# Patient Record
Sex: FEMALE | Race: WHITE | NOT HISPANIC OR LATINO | Employment: OTHER | URBAN - METROPOLITAN AREA
[De-identification: names, ages, dates, MRNs, and addresses within clinical notes are randomized per-mention and may not be internally consistent; named-entity substitution may affect disease eponyms.]

---

## 2020-11-29 ENCOUNTER — APPOINTMENT (OUTPATIENT)
Dept: GENERAL RADIOLOGY | Facility: HOSPITAL | Age: 65
End: 2020-11-29

## 2020-11-29 ENCOUNTER — HOSPITAL ENCOUNTER (EMERGENCY)
Facility: HOSPITAL | Age: 65
Discharge: HOME | End: 2020-11-29
Attending: EMERGENCY MEDICINE | Admitting: EMERGENCY MEDICINE

## 2020-11-29 ENCOUNTER — APPOINTMENT (OUTPATIENT)
Dept: CT IMAGING | Facility: HOSPITAL | Age: 65
End: 2020-11-29

## 2020-11-29 VITALS
HEIGHT: 61 IN | TEMPERATURE: 96.9 F | DIASTOLIC BLOOD PRESSURE: 84 MMHG | OXYGEN SATURATION: 94 % | WEIGHT: 210 LBS | HEART RATE: 78 BPM | SYSTOLIC BLOOD PRESSURE: 141 MMHG | RESPIRATION RATE: 16 BRPM | BODY MASS INDEX: 39.65 KG/M2

## 2020-11-29 DIAGNOSIS — S42.211A CLOSED DISPLACED FRACTURE OF SURGICAL NECK OF RIGHT HUMERUS, UNSPECIFIED FRACTURE MORPHOLOGY, INITIAL ENCOUNTER: ICD-10-CM

## 2020-11-29 DIAGNOSIS — S09.90XA INJURY OF HEAD, INITIAL ENCOUNTER: ICD-10-CM

## 2020-11-29 DIAGNOSIS — Z86.39 HISTORY OF HYPOTHYROIDISM: ICD-10-CM
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
## 2020-11-29 DIAGNOSIS — Z86.79 HISTORY OF HYPERTENSION: ICD-10-CM

## 2020-11-29 DIAGNOSIS — S16.1XXA ACUTE STRAIN OF NECK MUSCLE, INITIAL ENCOUNTER: ICD-10-CM

## 2020-11-29 PROCEDURE — 99283 EMERGENCY DEPT VISIT LOW MDM: CPT

## 2020-11-29 PROCEDURE — 63710000001 ONDANSETRON ODT 4 MG TABLET DISPERSIBLE: Performed by: EMERGENCY MEDICINE

## 2020-11-29 PROCEDURE — 70450 CT HEAD/BRAIN W/O DYE: CPT

## 2020-11-29 PROCEDURE — 72040 X-RAY EXAM NECK SPINE 2-3 VW: CPT

## 2020-11-29 PROCEDURE — 73030 X-RAY EXAM OF SHOULDER: CPT

## 2020-11-29 RX ORDER — OXYCODONE HYDROCHLORIDE AND ACETAMINOPHEN 5; 325 MG/1; MG/1
1 TABLET ORAL ONCE
Status: COMPLETED | OUTPATIENT
Start: 2020-11-29 | End: 2020-11-29

## 2020-11-29 RX ORDER — ONDANSETRON 4 MG/1
4 TABLET, ORALLY DISINTEGRATING ORAL ONCE
Status: COMPLETED | OUTPATIENT
Start: 2020-11-29 | End: 2020-11-29

## 2020-11-29 RX ORDER — ONDANSETRON 4 MG/1
4 TABLET, ORALLY DISINTEGRATING ORAL EVERY 4 HOURS
Qty: 12 TABLET | Refills: 0 | Status: SHIPPED | OUTPATIENT
Start: 2020-11-29 | End: 2020-12-04

## 2020-11-29 RX ORDER — OXYCODONE HYDROCHLORIDE AND ACETAMINOPHEN 5; 325 MG/1; MG/1
1 TABLET ORAL EVERY 4 HOURS PRN
Qty: 12 TABLET | Refills: 0 | Status: SHIPPED | OUTPATIENT
Start: 2020-11-29 | End: 2020-12-09 | Stop reason: HOSPADM

## 2020-11-29 RX ADMIN — ONDANSETRON 4 MG: 4 TABLET, ORALLY DISINTEGRATING ORAL at 20:05

## 2020-11-29 RX ADMIN — OXYCODONE HYDROCHLORIDE AND ACETAMINOPHEN 1 TABLET: 5; 325 TABLET ORAL at 20:04

## 2020-12-04 ENCOUNTER — APPOINTMENT (OUTPATIENT)
Dept: PREADMISSION TESTING | Facility: HOSPITAL | Age: 65
End: 2020-12-04

## 2020-12-04 ENCOUNTER — HOSPITAL ENCOUNTER (OUTPATIENT)
Dept: GENERAL RADIOLOGY | Facility: HOSPITAL | Age: 65
Discharge: HOME | End: 2020-12-04

## 2020-12-04 VITALS — BODY MASS INDEX: 39.12 KG/M2 | HEIGHT: 62 IN | WEIGHT: 212.6 LBS

## 2020-12-04 LAB
ALBUMIN SERPL-MCNC: 4.1 G/DL (ref 3.5–5.2)
ALBUMIN/GLOB SERPL: 1.3 G/DL
ALP SERPL-CCNC: 109 U/L (ref 39–117)
ALT SERPL W P-5'-P-CCNC: 20 U/L (ref 1–33)
ANION GAP SERPL CALCULATED.3IONS-SCNC: 10 MMOL/L (ref 5–15)
APTT PPP: 32.6 SECONDS (ref 24–37)
AST SERPL-CCNC: 21 U/L (ref 1–32)
BACTERIA UR QL AUTO: ABNORMAL /HPF
BASOPHILS # BLD AUTO: 0.07 10*3/MM3 (ref 0–0.2)
BASOPHILS NFR BLD AUTO: 0.6 % (ref 0–1.5)
BILIRUB SERPL-MCNC: 0.4 MG/DL (ref 0–1.2)
BILIRUB UR QL STRIP: NEGATIVE
BUN SERPL-MCNC: 14 MG/DL (ref 8–23)
BUN/CREAT SERPL: 20.3 (ref 7–25)
CALCIUM SPEC-SCNC: 9.8 MG/DL (ref 8.6–10.5)
CHLORIDE SERPL-SCNC: 104 MMOL/L (ref 98–107)
CLARITY UR: CLEAR
CO2 SERPL-SCNC: 25 MMOL/L (ref 22–29)
COLOR UR: YELLOW
CREAT SERPL-MCNC: 0.69 MG/DL (ref 0.57–1)
DEPRECATED RDW RBC AUTO: 42.2 FL (ref 37–54)
EOSINOPHIL # BLD AUTO: 0.16 10*3/MM3 (ref 0–0.4)
EOSINOPHIL NFR BLD AUTO: 1.5 % (ref 0.3–6.2)
ERYTHROCYTE [DISTWIDTH] IN BLOOD BY AUTOMATED COUNT: 12.9 % (ref 12.3–15.4)
GFR SERPL CREATININE-BSD FRML MDRD: 85 ML/MIN/1.73
GLOBULIN UR ELPH-MCNC: 3.1 GM/DL
GLUCOSE SERPL-MCNC: 84 MG/DL (ref 65–99)
GLUCOSE UR STRIP-MCNC: NEGATIVE MG/DL
HBA1C MFR BLD: 5.7 % (ref 4.8–5.6)
HCT VFR BLD AUTO: 40.8 % (ref 34–46.6)
HGB BLD-MCNC: 12.8 G/DL (ref 12–15.9)
HGB UR QL STRIP.AUTO: NEGATIVE
HYALINE CASTS UR QL AUTO: ABNORMAL /LPF
IMM GRANULOCYTES # BLD AUTO: 0.03 10*3/MM3 (ref 0–0.05)
IMM GRANULOCYTES NFR BLD AUTO: 0.3 % (ref 0–0.5)
INR PPP: 1.09 (ref 0.85–1.16)
KETONES UR QL STRIP: NEGATIVE
LEUKOCYTE ESTERASE UR QL STRIP.AUTO: ABNORMAL
LYMPHOCYTES # BLD AUTO: 2.29 10*3/MM3 (ref 0.7–3.1)
LYMPHOCYTES NFR BLD AUTO: 20.9 % (ref 19.6–45.3)
MCH RBC QN AUTO: 28.2 PG (ref 26.6–33)
MCHC RBC AUTO-ENTMCNC: 31.4 G/DL (ref 31.5–35.7)
MCV RBC AUTO: 89.9 FL (ref 79–97)
MONOCYTES # BLD AUTO: 0.98 10*3/MM3 (ref 0.1–0.9)
MONOCYTES NFR BLD AUTO: 8.9 % (ref 5–12)
NEUTROPHILS NFR BLD AUTO: 67.8 % (ref 42.7–76)
NEUTROPHILS NFR BLD AUTO: 7.45 10*3/MM3 (ref 1.7–7)
NITRITE UR QL STRIP: NEGATIVE
NRBC BLD AUTO-RTO: 0 /100 WBC (ref 0–0.2)
PH UR STRIP.AUTO: 7 [PH] (ref 5–8)
PLATELET # BLD AUTO: 303 10*3/MM3 (ref 140–450)
PMV BLD AUTO: 8.8 FL (ref 6–12)
POTASSIUM SERPL-SCNC: 3.9 MMOL/L (ref 3.5–5.2)
PROT SERPL-MCNC: 7.2 G/DL (ref 6–8.5)
PROT UR QL STRIP: NEGATIVE
PROTHROMBIN TIME: 13.8 SECONDS (ref 11.5–14)
QT INTERVAL: 380 MS
QTC INTERVAL: 446 MS
RBC # BLD AUTO: 4.54 10*6/MM3 (ref 3.77–5.28)
RBC # UR: ABNORMAL /HPF
REF LAB TEST METHOD: ABNORMAL
SODIUM SERPL-SCNC: 139 MMOL/L (ref 136–145)
SP GR UR STRIP: 1.01 (ref 1–1.03)
SQUAMOUS #/AREA URNS HPF: ABNORMAL /HPF
UROBILINOGEN UR QL STRIP: ABNORMAL
WBC # BLD AUTO: 10.98 10*3/MM3 (ref 3.4–10.8)
WBC UR QL AUTO: ABNORMAL /HPF

## 2020-12-04 PROCEDURE — 71046 X-RAY EXAM CHEST 2 VIEWS: CPT

## 2020-12-04 PROCEDURE — 93005 ELECTROCARDIOGRAM TRACING: CPT

## 2020-12-04 PROCEDURE — 85025 COMPLETE CBC W/AUTO DIFF WBC: CPT

## 2020-12-04 PROCEDURE — 93010 ELECTROCARDIOGRAM REPORT: CPT | Performed by: INTERNAL MEDICINE

## 2020-12-04 PROCEDURE — 87086 URINE CULTURE/COLONY COUNT: CPT

## 2020-12-04 PROCEDURE — 85610 PROTHROMBIN TIME: CPT

## 2020-12-04 PROCEDURE — 83036 HEMOGLOBIN GLYCOSYLATED A1C: CPT

## 2020-12-04 PROCEDURE — 81001 URINALYSIS AUTO W/SCOPE: CPT

## 2020-12-04 PROCEDURE — 36415 COLL VENOUS BLD VENIPUNCTURE: CPT

## 2020-12-04 PROCEDURE — 80053 COMPREHEN METABOLIC PANEL: CPT

## 2020-12-04 PROCEDURE — 85730 THROMBOPLASTIN TIME PARTIAL: CPT

## 2020-12-04 RX ORDER — ACETAMINOPHEN 500 MG
1000 TABLET ORAL EVERY 6 HOURS PRN
COMMUNITY
End: 2021-04-30

## 2020-12-04 RX ORDER — LEVOTHYROXINE SODIUM 75 MCG
75 TABLET ORAL DAILY
COMMUNITY
Start: 2020-09-29

## 2020-12-04 RX ORDER — VENLAFAXINE HYDROCHLORIDE 150 MG/1
150 CAPSULE, EXTENDED RELEASE ORAL DAILY
COMMUNITY
Start: 2020-09-21

## 2020-12-04 RX ORDER — AMLODIPINE BESYLATE 2.5 MG/1
2.5 TABLET ORAL EVERY MORNING
COMMUNITY
Start: 2020-09-21

## 2020-12-04 NOTE — PAT
Patient to apply Chlorhexadine wipes  to surgical area (as instructed) the night before procedure and the AM of procedure. Wipes provided.    Patient instructed to drink 20 ounces (or until full) of Gatorade and it needs to be completed 1 hour before given arrival time for procedure (NO RED Gatorade)    Patient verbalized understanding.    An arrival time for procedure was not given during PAT visit. If patient had any questions or concerns about their arrival time, they were instructed to contact their surgeon/physician.  Additionally, if the patient referred to an arrival time that was acquired from their my chart account, patient was encouraged to verify that time with their surgeon/physician.  NO arrival times given in Pre Admission Testing Department.

## 2020-12-05 LAB — BACTERIA SPEC AEROBE CULT: ABNORMAL

## 2020-12-06 ENCOUNTER — APPOINTMENT (OUTPATIENT)
Dept: PREADMISSION TESTING | Facility: HOSPITAL | Age: 65
End: 2020-12-06

## 2020-12-06 LAB — SARS-COV-2 RNA RESP QL NAA+PROBE: NOT DETECTED

## 2020-12-06 PROCEDURE — C9803 HOPD COVID-19 SPEC COLLECT: HCPCS

## 2020-12-06 PROCEDURE — U0004 COV-19 TEST NON-CDC HGH THRU: HCPCS

## 2020-12-08 ENCOUNTER — APPOINTMENT (OUTPATIENT)
Dept: GENERAL RADIOLOGY | Facility: HOSPITAL | Age: 65
End: 2020-12-08

## 2020-12-08 ENCOUNTER — ANESTHESIA EVENT (OUTPATIENT)
Dept: PERIOP | Facility: HOSPITAL | Age: 65
End: 2020-12-08

## 2020-12-08 ENCOUNTER — ANESTHESIA (OUTPATIENT)
Dept: PERIOP | Facility: HOSPITAL | Age: 65
End: 2020-12-08

## 2020-12-08 ENCOUNTER — HOSPITAL ENCOUNTER (OUTPATIENT)
Facility: HOSPITAL | Age: 65
LOS: 1 days | Discharge: HOME | End: 2020-12-09
Attending: ORTHOPAEDIC SURGERY | Admitting: ORTHOPAEDIC SURGERY

## 2020-12-08 DIAGNOSIS — Z87.81 S/P ORIF (OPEN REDUCTION INTERNAL FIXATION) FRACTURE: Primary | ICD-10-CM
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
## 2020-12-08 DIAGNOSIS — S42.211A CLOSED DISPLACED FRACTURE OF SURGICAL NECK OF RIGHT HUMERUS, UNSPECIFIED FRACTURE MORPHOLOGY, INITIAL ENCOUNTER: ICD-10-CM

## 2020-12-08 DIAGNOSIS — Z98.890 S/P ORIF (OPEN REDUCTION INTERNAL FIXATION) FRACTURE: Primary | ICD-10-CM

## 2020-12-08 PROBLEM — I10 ESSENTIAL HYPERTENSION: Status: ACTIVE | Noted: 2020-12-08

## 2020-12-08 PROBLEM — S42.209A PROXIMAL HUMERAL FRACTURE: Status: ACTIVE | Noted: 2020-12-08

## 2020-12-08 PROBLEM — E66.9 OBESITY (BMI 30-39.9): Status: ACTIVE | Noted: 2020-12-08

## 2020-12-08 PROBLEM — Z99.89 OSA ON CPAP: Status: ACTIVE | Noted: 2020-12-08

## 2020-12-08 PROBLEM — G47.33 OSA ON CPAP: Status: ACTIVE | Noted: 2020-12-08

## 2020-12-08 PROCEDURE — 25010000002 ONDANSETRON PER 1 MG: Performed by: NURSE ANESTHETIST, CERTIFIED REGISTERED

## 2020-12-08 PROCEDURE — C1713 ANCHOR/SCREW BN/BN,TIS/BN: HCPCS | Performed by: ORTHOPAEDIC SURGERY

## 2020-12-08 PROCEDURE — 25010000002 PHENYLEPHRINE PER 1 ML: Performed by: NURSE ANESTHETIST, CERTIFIED REGISTERED

## 2020-12-08 PROCEDURE — 25010000002 CEFAZOLIN PER 500 MG: Performed by: ORTHOPAEDIC SURGERY

## 2020-12-08 PROCEDURE — 25010000002 VANCOMYCIN 1 G RECONSTITUTED SOLUTION: Performed by: ORTHOPAEDIC SURGERY

## 2020-12-08 PROCEDURE — 25010000003 CEFAZOLIN IN DEXTROSE 2-4 GM/100ML-% SOLUTION: Performed by: ORTHOPAEDIC SURGERY

## 2020-12-08 PROCEDURE — 25010000002 DEXAMETHASONE PER 1 MG: Performed by: NURSE ANESTHETIST, CERTIFIED REGISTERED

## 2020-12-08 PROCEDURE — 25010000002 NEOSTIGMINE 10 MG/10ML SOLUTION: Performed by: NURSE ANESTHETIST, CERTIFIED REGISTERED

## 2020-12-08 PROCEDURE — 76000 FLUOROSCOPY <1 HR PHYS/QHP: CPT

## 2020-12-08 PROCEDURE — 25010000002 ROPIVACAINE PER 1 MG: Performed by: NURSE ANESTHETIST, CERTIFIED REGISTERED

## 2020-12-08 PROCEDURE — 25010000002 PROPOFOL 10 MG/ML EMULSION: Performed by: NURSE ANESTHETIST, CERTIFIED REGISTERED

## 2020-12-08 PROCEDURE — 25010000002 FENTANYL CITRATE (PF) 100 MCG/2ML SOLUTION: Performed by: NURSE ANESTHETIST, CERTIFIED REGISTERED

## 2020-12-08 DEVICE — CANNULATED SCREW, Ø3.5MM X L39MM
Type: IMPLANTABLE DEVICE | Site: ARM | Status: FUNCTIONAL
Brand: CANNULATED SCREW, 3.5MM

## 2020-12-08 DEVICE — CANNULATED SCREW, Ø3.5MM X L36MM
Type: IMPLANTABLE DEVICE | Site: ARM | Status: FUNCTIONAL
Brand: CANNULATED SCREW, 3.5MM

## 2020-12-08 DEVICE — CORTICAL SCREW, Ø3.5MM X L30MM
Type: IMPLANTABLE DEVICE | Site: ARM | Status: FUNCTIONAL
Brand: CORTICAL SCREW, 3.5MM

## 2020-12-08 DEVICE — CANNULATED SCREW, Ø3.5MM X L42MM
Type: IMPLANTABLE DEVICE | Site: ARM | Status: FUNCTIONAL
Brand: CANNULATED SCREW, 3.5MM

## 2020-12-08 DEVICE — CORTICAL SCREW, Ø3.5MM X L24MM
Type: IMPLANTABLE DEVICE | Site: ARM | Status: FUNCTIONAL
Brand: CORTICAL SCREW, 3.5MM

## 2020-12-08 DEVICE — IMPLANTABLE DEVICE
Type: IMPLANTABLE DEVICE | Site: ARM | Status: FUNCTIONAL
Brand: CERAMENT™BONE VOID FILLER

## 2020-12-08 DEVICE — SUT FW #2 W/TPR NDL 1/2 CIR 38IN 97CM 26.5MM BLU: Type: IMPLANTABLE DEVICE | Site: ARM | Status: FUNCTIONAL

## 2020-12-08 DEVICE — CANNULATED SCREW, Ø3.5MM X L45MM
Type: IMPLANTABLE DEVICE | Site: ARM | Status: FUNCTIONAL
Brand: CANNULATED SCREW, 3.5MM

## 2020-12-08 DEVICE — PROXIMAL HUMERUS PLATE, 3 HOLE RIGHT
Type: IMPLANTABLE DEVICE | Site: ARM | Status: FUNCTIONAL
Brand: PROXIMAL HUMERUS PLATE

## 2020-12-08 DEVICE — LOCKING CAP, 3.5T
Type: IMPLANTABLE DEVICE | Site: ARM | Status: FUNCTIONAL
Brand: LOCKING CAP

## 2020-12-08 RX ORDER — PROMETHAZINE HYDROCHLORIDE 25 MG/1
25 TABLET ORAL ONCE AS NEEDED
Status: DISCONTINUED | OUTPATIENT
Start: 2020-12-08 | End: 2020-12-08 | Stop reason: HOSPADM

## 2020-12-08 RX ORDER — SODIUM CHLORIDE 0.9 % (FLUSH) 0.9 %
10 SYRINGE (ML) INJECTION EVERY 12 HOURS SCHEDULED
Status: CANCELLED | OUTPATIENT
Start: 2020-12-08

## 2020-12-08 RX ORDER — PREGABALIN 75 MG/1
75 CAPSULE ORAL ONCE
Status: COMPLETED | OUTPATIENT
Start: 2020-12-08 | End: 2020-12-08

## 2020-12-08 RX ORDER — SODIUM CHLORIDE 0.9 % (FLUSH) 0.9 %
10 SYRINGE (ML) INJECTION AS NEEDED
Status: CANCELLED | OUTPATIENT
Start: 2020-12-08

## 2020-12-08 RX ORDER — VENLAFAXINE HYDROCHLORIDE 75 MG/1
150 CAPSULE, EXTENDED RELEASE ORAL DAILY
Status: DISCONTINUED | OUTPATIENT
Start: 2020-12-09 | End: 2020-12-09 | Stop reason: HOSPADM

## 2020-12-08 RX ORDER — ONDANSETRON 2 MG/ML
4 INJECTION INTRAMUSCULAR; INTRAVENOUS ONCE AS NEEDED
Status: DISCONTINUED | OUTPATIENT
Start: 2020-12-08 | End: 2020-12-08 | Stop reason: HOSPADM

## 2020-12-08 RX ORDER — ACETAMINOPHEN 650 MG/1
650 SUPPOSITORY RECTAL EVERY 4 HOURS PRN
Status: DISCONTINUED | OUTPATIENT
Start: 2020-12-08 | End: 2020-12-09 | Stop reason: HOSPADM

## 2020-12-08 RX ORDER — GLYCOPYRROLATE 0.2 MG/ML
INJECTION INTRAMUSCULAR; INTRAVENOUS AS NEEDED
Status: DISCONTINUED | OUTPATIENT
Start: 2020-12-08 | End: 2020-12-08 | Stop reason: SURG

## 2020-12-08 RX ORDER — ONDANSETRON 2 MG/ML
4 INJECTION INTRAMUSCULAR; INTRAVENOUS EVERY 6 HOURS PRN
Status: DISCONTINUED | OUTPATIENT
Start: 2020-12-08 | End: 2020-12-09 | Stop reason: HOSPADM

## 2020-12-08 RX ORDER — AMLODIPINE BESYLATE 2.5 MG/1
2.5 TABLET ORAL DAILY
Status: DISCONTINUED | OUTPATIENT
Start: 2020-12-09 | End: 2020-12-09 | Stop reason: HOSPADM

## 2020-12-08 RX ORDER — ONDANSETRON 4 MG/1
4 TABLET, FILM COATED ORAL EVERY 6 HOURS PRN
Status: DISCONTINUED | OUTPATIENT
Start: 2020-12-08 | End: 2020-12-09 | Stop reason: HOSPADM

## 2020-12-08 RX ORDER — BUPIVACAINE HYDROCHLORIDE 2.5 MG/ML
INJECTION, SOLUTION EPIDURAL; INFILTRATION; INTRACAUDAL
Status: COMPLETED | OUTPATIENT
Start: 2020-12-08 | End: 2020-12-08

## 2020-12-08 RX ORDER — SODIUM CHLORIDE, SODIUM LACTATE, POTASSIUM CHLORIDE, CALCIUM CHLORIDE 600; 310; 30; 20 MG/100ML; MG/100ML; MG/100ML; MG/100ML
9 INJECTION, SOLUTION INTRAVENOUS CONTINUOUS
Status: DISCONTINUED | OUTPATIENT
Start: 2020-12-08 | End: 2020-12-08

## 2020-12-08 RX ORDER — MAGNESIUM HYDROXIDE 1200 MG/15ML
LIQUID ORAL AS NEEDED
Status: DISCONTINUED | OUTPATIENT
Start: 2020-12-08 | End: 2020-12-08 | Stop reason: HOSPADM

## 2020-12-08 RX ORDER — LIDOCAINE HYDROCHLORIDE 10 MG/ML
INJECTION, SOLUTION EPIDURAL; INFILTRATION; INTRACAUDAL; PERINEURAL AS NEEDED
Status: DISCONTINUED | OUTPATIENT
Start: 2020-12-08 | End: 2020-12-08 | Stop reason: SURG

## 2020-12-08 RX ORDER — LABETALOL HYDROCHLORIDE 5 MG/ML
5 INJECTION, SOLUTION INTRAVENOUS
Status: DISCONTINUED | OUTPATIENT
Start: 2020-12-08 | End: 2020-12-08 | Stop reason: HOSPADM

## 2020-12-08 RX ORDER — ACETAMINOPHEN 325 MG/1
650 TABLET ORAL EVERY 4 HOURS PRN
Status: DISCONTINUED | OUTPATIENT
Start: 2020-12-08 | End: 2020-12-09 | Stop reason: HOSPADM

## 2020-12-08 RX ORDER — OXYCODONE HYDROCHLORIDE 5 MG/1
10 TABLET ORAL EVERY 4 HOURS PRN
Status: DISCONTINUED | OUTPATIENT
Start: 2020-12-08 | End: 2020-12-09 | Stop reason: HOSPADM

## 2020-12-08 RX ORDER — VANCOMYCIN HYDROCHLORIDE 1 G/20ML
INJECTION, POWDER, LYOPHILIZED, FOR SOLUTION INTRAVENOUS AS NEEDED
Status: DISCONTINUED | OUTPATIENT
Start: 2020-12-08 | End: 2020-12-08 | Stop reason: HOSPADM

## 2020-12-08 RX ORDER — FENTANYL CITRATE 50 UG/ML
INJECTION, SOLUTION INTRAMUSCULAR; INTRAVENOUS
Status: COMPLETED | OUTPATIENT
Start: 2020-12-08 | End: 2020-12-08

## 2020-12-08 RX ORDER — ACETAMINOPHEN 500 MG
1000 TABLET ORAL ONCE
Status: COMPLETED | OUTPATIENT
Start: 2020-12-08 | End: 2020-12-08

## 2020-12-08 RX ORDER — NEOSTIGMINE METHYLSULFATE 1 MG/ML
INJECTION, SOLUTION INTRAVENOUS AS NEEDED
Status: DISCONTINUED | OUTPATIENT
Start: 2020-12-08 | End: 2020-12-08 | Stop reason: SURG

## 2020-12-08 RX ORDER — NALOXONE HCL 0.4 MG/ML
0.1 VIAL (ML) INJECTION
Status: DISCONTINUED | OUTPATIENT
Start: 2020-12-08 | End: 2020-12-09 | Stop reason: HOSPADM

## 2020-12-08 RX ORDER — FAMOTIDINE 10 MG/ML
20 INJECTION, SOLUTION INTRAVENOUS ONCE
Status: CANCELLED | OUTPATIENT
Start: 2020-12-08 | End: 2020-12-08

## 2020-12-08 RX ORDER — FAMOTIDINE 20 MG/1
20 TABLET, FILM COATED ORAL ONCE
Status: COMPLETED | OUTPATIENT
Start: 2020-12-08 | End: 2020-12-08

## 2020-12-08 RX ORDER — DEXAMETHASONE SODIUM PHOSPHATE 10 MG/ML
INJECTION INTRAMUSCULAR; INTRAVENOUS AS NEEDED
Status: DISCONTINUED | OUTPATIENT
Start: 2020-12-08 | End: 2020-12-08 | Stop reason: SURG

## 2020-12-08 RX ORDER — LEVOTHYROXINE SODIUM 0.07 MG/1
75 TABLET ORAL DAILY
Status: DISCONTINUED | OUTPATIENT
Start: 2020-12-09 | End: 2020-12-09 | Stop reason: HOSPADM

## 2020-12-08 RX ORDER — LIDOCAINE HYDROCHLORIDE 10 MG/ML
0.5 INJECTION, SOLUTION EPIDURAL; INFILTRATION; INTRACAUDAL; PERINEURAL ONCE AS NEEDED
Status: COMPLETED | OUTPATIENT
Start: 2020-12-08 | End: 2020-12-08

## 2020-12-08 RX ORDER — OXYCODONE HYDROCHLORIDE 5 MG/1
5 TABLET ORAL EVERY 4 HOURS PRN
Status: DISCONTINUED | OUTPATIENT
Start: 2020-12-08 | End: 2020-12-09 | Stop reason: HOSPADM

## 2020-12-08 RX ORDER — HYDROMORPHONE HCL 110MG/55ML
0.5 PATIENT CONTROLLED ANALGESIA SYRINGE INTRAVENOUS
Status: DISCONTINUED | OUTPATIENT
Start: 2020-12-08 | End: 2020-12-09 | Stop reason: HOSPADM

## 2020-12-08 RX ORDER — LABETALOL HYDROCHLORIDE 5 MG/ML
10 INJECTION, SOLUTION INTRAVENOUS EVERY 4 HOURS PRN
Status: DISCONTINUED | OUTPATIENT
Start: 2020-12-08 | End: 2020-12-09 | Stop reason: HOSPADM

## 2020-12-08 RX ORDER — ONDANSETRON 2 MG/ML
INJECTION INTRAMUSCULAR; INTRAVENOUS AS NEEDED
Status: DISCONTINUED | OUTPATIENT
Start: 2020-12-08 | End: 2020-12-08 | Stop reason: SURG

## 2020-12-08 RX ORDER — IPRATROPIUM BROMIDE AND ALBUTEROL SULFATE 2.5; .5 MG/3ML; MG/3ML
3 SOLUTION RESPIRATORY (INHALATION) ONCE AS NEEDED
Status: DISCONTINUED | OUTPATIENT
Start: 2020-12-08 | End: 2020-12-08 | Stop reason: HOSPADM

## 2020-12-08 RX ORDER — CEFAZOLIN SODIUM 2 G/100ML
2 INJECTION, SOLUTION INTRAVENOUS ONCE
Status: COMPLETED | OUTPATIENT
Start: 2020-12-08 | End: 2020-12-08

## 2020-12-08 RX ORDER — PROPOFOL 10 MG/ML
VIAL (ML) INTRAVENOUS AS NEEDED
Status: DISCONTINUED | OUTPATIENT
Start: 2020-12-08 | End: 2020-12-08 | Stop reason: SURG

## 2020-12-08 RX ORDER — CEFAZOLIN SODIUM 2 G/100ML
2 INJECTION, SOLUTION INTRAVENOUS EVERY 8 HOURS
Status: COMPLETED | OUTPATIENT
Start: 2020-12-08 | End: 2020-12-09

## 2020-12-08 RX ORDER — ROCURONIUM BROMIDE 10 MG/ML
INJECTION, SOLUTION INTRAVENOUS AS NEEDED
Status: DISCONTINUED | OUTPATIENT
Start: 2020-12-08 | End: 2020-12-08 | Stop reason: SURG

## 2020-12-08 RX ORDER — FENTANYL CITRATE 50 UG/ML
25 INJECTION, SOLUTION INTRAMUSCULAR; INTRAVENOUS
Status: DISCONTINUED | OUTPATIENT
Start: 2020-12-08 | End: 2020-12-08 | Stop reason: HOSPADM

## 2020-12-08 RX ORDER — SODIUM CHLORIDE 450 MG/100ML
50 INJECTION, SOLUTION INTRAVENOUS CONTINUOUS
Status: DISCONTINUED | OUTPATIENT
Start: 2020-12-08 | End: 2020-12-09 | Stop reason: HOSPADM

## 2020-12-08 RX ADMIN — EPHEDRINE SULFATE 5 MG: 50 INJECTION INTRAMUSCULAR; INTRAVENOUS; SUBCUTANEOUS at 14:35

## 2020-12-08 RX ADMIN — SODIUM CHLORIDE, POTASSIUM CHLORIDE, SODIUM LACTATE AND CALCIUM CHLORIDE: 600; 310; 30; 20 INJECTION, SOLUTION INTRAVENOUS at 15:51

## 2020-12-08 RX ADMIN — PROPOFOL 200 MG: 10 INJECTION, EMULSION INTRAVENOUS at 14:08

## 2020-12-08 RX ADMIN — FENTANYL CITRATE 100 MCG: 50 INJECTION, SOLUTION INTRAMUSCULAR; INTRAVENOUS at 12:43

## 2020-12-08 RX ADMIN — LIDOCAINE HYDROCHLORIDE 50 MG: 10 INJECTION, SOLUTION EPIDURAL; INFILTRATION; INTRACAUDAL; PERINEURAL at 14:08

## 2020-12-08 RX ADMIN — GLYCOPYRROLATE 0.1 MG: 0.2 INJECTION INTRAMUSCULAR; INTRAVENOUS at 16:11

## 2020-12-08 RX ADMIN — ACETAMINOPHEN 1000 MG: 500 TABLET ORAL at 11:57

## 2020-12-08 RX ADMIN — TRANEXAMIC ACID 1000 MG: 100 INJECTION, SOLUTION INTRAVENOUS at 14:11

## 2020-12-08 RX ADMIN — BUPIVACAINE HYDROCHLORIDE 30 ML: 2.5 INJECTION, SOLUTION EPIDURAL; INFILTRATION; INTRACAUDAL; PERINEURAL at 12:43

## 2020-12-08 RX ADMIN — ONDANSETRON 4 MG: 2 INJECTION INTRAMUSCULAR; INTRAVENOUS at 16:04

## 2020-12-08 RX ADMIN — EPHEDRINE SULFATE 5 MG: 50 INJECTION INTRAMUSCULAR; INTRAVENOUS; SUBCUTANEOUS at 14:23

## 2020-12-08 RX ADMIN — PHENYLEPHRINE HYDROCHLORIDE 0.5 MCG/KG/MIN: 10 INJECTION INTRAVENOUS at 14:45

## 2020-12-08 RX ADMIN — SODIUM CHLORIDE, POTASSIUM CHLORIDE, SODIUM LACTATE AND CALCIUM CHLORIDE 9 ML/HR: 600; 310; 30; 20 INJECTION, SOLUTION INTRAVENOUS at 11:57

## 2020-12-08 RX ADMIN — EPHEDRINE SULFATE 10 MG: 50 INJECTION INTRAMUSCULAR; INTRAVENOUS; SUBCUTANEOUS at 14:20

## 2020-12-08 RX ADMIN — SODIUM CHLORIDE 50 ML/HR: 4.5 INJECTION, SOLUTION INTRAVENOUS at 18:24

## 2020-12-08 RX ADMIN — TRANEXAMIC ACID 1000 MG: 100 INJECTION, SOLUTION INTRAVENOUS at 16:07

## 2020-12-08 RX ADMIN — FAMOTIDINE 20 MG: 20 TABLET, FILM COATED ORAL at 11:57

## 2020-12-08 RX ADMIN — NEOSTIGMINE 2 MG: 1 INJECTION INTRAVENOUS at 16:11

## 2020-12-08 RX ADMIN — ROCURONIUM BROMIDE 40 MG: 10 INJECTION INTRAVENOUS at 14:08

## 2020-12-08 RX ADMIN — PREGABALIN 75 MG: 75 CAPSULE ORAL at 11:57

## 2020-12-08 RX ADMIN — CEFAZOLIN SODIUM 2 G: 10 INJECTION, POWDER, FOR SOLUTION INTRAVENOUS at 22:28

## 2020-12-08 RX ADMIN — ROPIVACAINE HYDROCHLORIDE 6 ML/HR: 5 INJECTION, SOLUTION EPIDURAL; INFILTRATION; PERINEURAL at 16:58

## 2020-12-08 RX ADMIN — ROCURONIUM BROMIDE 10 MG: 10 INJECTION INTRAVENOUS at 14:45

## 2020-12-08 RX ADMIN — CEFAZOLIN SODIUM 2 G: 2 INJECTION, SOLUTION INTRAVENOUS at 14:04

## 2020-12-08 RX ADMIN — LIDOCAINE HYDROCHLORIDE 0.5 ML: 10 INJECTION, SOLUTION EPIDURAL; INFILTRATION; INTRACAUDAL; PERINEURAL at 11:57

## 2020-12-08 RX ADMIN — DEXAMETHASONE SODIUM PHOSPHATE 8 MG: 10 INJECTION INTRAMUSCULAR; INTRAVENOUS at 14:13

## 2020-12-08 NOTE — ANESTHESIA PROCEDURE NOTES
INTERSCALENE cATHETER      Patient reassessed immediately prior to procedure    Patient location during procedure: pre-op  Reason for block: at surgeon's request and post-op pain management  Performed by  CRNA: Brittney Chand CRNA  Assisted by: Yuko Mckeon RN  Preanesthetic Checklist  Completed: patient identified, site marked, surgical consent, pre-op evaluation, timeout performed, IV checked, risks and benefits discussed and monitors and equipment checked  Prep:  Pt Position: left lateral decubitus  Sterile barriers:cap, gloves, mask and sterile barriers  Prep: ChloraPrep  Patient monitoring: blood pressure monitoring, continuous pulse oximetry and EKG  Procedure  Sedation:yes  Performed under: local infiltration  Guidance:ultrasound guided  Images:still images obtained, printed/placed on chart    Laterality:right  Block Type:interscalene  Injection Technique:catheter  Needle Type:Tuohy and echogenic  Needle Gauge:18 G  Resistance on Injection: none  Catheter Size:20 G (20g)  Cath Depth at skin: 7 cm    Medications Used: fentaNYL citrate (PF) (SUBLIMAZE) injection, 100 mcg  bupivacaine PF (MARCAINE) 0.25 % injection, 30 mL  Med admintered at 12/8/2020 12:43 PM      Post Assessment  Injection Assessment: negative aspiration for heme, no paresthesia on injection and incremental injection  Patient Tolerance:comfortable throughout block  Complications:no  Additional Notes  Procedure:                 The pt was placed in semifowlers position with a slight tilt of the thorax contralateral to the insertion site.  The Insertion Site was prepped and draped in sterile fashion.  The pt was anesthetized with  IV Sedation( see meds) and  Skin and cutaneous tissue was infiltrated and anesthetized with 1% Lidocaine 3 mls via a 25g needle.  Utilizing ultrasound guidance, a BBraun 2 inch 18 g Contiplex echogenic touhy needle was advanced in-plane.  Hydro dissection of tissue was achieved with Normal saline. Major  vessels(carotid and Internal Jugular) where visualized as the brachial plexus was approached at the approximate level of C-7/ T-1.  Cervical 5 and Branches of Cervical 6 nerve roots where visualized and the needle tip was placed posterior at the level of C-6 roots.  LA spread was visualized and injection was made incrementally every 5 mls with aspiration. Injection pressure was normal or little, there was no intraneural injection, no vascular injection.      The BBraun 20 g wire stylet  catheter was then placed under US guidance on the posterior aspect of the Brachial Plexus.  Location of catheter was confirmed with NS injection visualized with US . The tuohy was then removed and the skin was sealed with Skin AFix at catheter insertion site.  Skin was prepped with mastisol and the labeled catheter  was secured with steristrips and a CHG tegaderm. Thank You.

## 2020-12-08 NOTE — ANESTHESIA PROCEDURE NOTES
Airway  Urgency: elective    Date/Time: 12/8/2020 2:10 PM  Airway not difficult    General Information and Staff    Patient location during procedure: OR  CRNA: Lian Clinton CRNA    Indications and Patient Condition  Indications for airway management: airway protection    Preoxygenated: yes  MILS not maintained throughout  Mask difficulty assessment: 1 - vent by mask    Final Airway Details  Final airway type: endotracheal airway      Successful airway: ETT  Cuffed: yes   Successful intubation technique: video laryngoscopy  Endotracheal tube insertion site: oral  Blade: Mcrae  Blade size: 3  ETT size (mm): 7.0  Cormack-Lehane Classification: grade I - full view of glottis  Placement verified by: chest auscultation and capnometry   Cuff volume (mL): 6  Measured from: lips  ETT/EBT  to lips (cm): 20  Number of attempts at approach: 1  Assessment: lips, teeth, and gum same as pre-op and atraumatic intubation    Additional Comments  Negative epigastric sounds, Breath sound equal bilaterally with symmetric chest rise and fall

## 2020-12-08 NOTE — H&P
Pre-Op H&P  Génesis Bañuelos  [id number]  1955      Chief complaint: Right humerus fracture      Subjective:  Patient is a 65 y.o.female presents for scheduled surgery by Dr. Acosta.  She anticipates a Right humerus proximal open reduction internal fixation today.  She injured her arm in a fall on 11/29/2020.  She was seen in the emergency room at that time.  Of note on preadmission testing 12/4 she was found to have an E. coli UTI and is still taking oral antibiotics.  She did not denies urinary symptoms.      Review of Systems:  Constitutional-- No fever, chills or sweats. No fatigue.  CV-- No chest pain, palpitation or syncope. +HTN  Resp-- No SOB, cough, hemoptysis  Skin--No rashes or lesions      Allergies:   Allergies   Allergen Reactions   • Penicillins Rash         Home Meds:  Medications Prior to Admission   Medication Sig Dispense Refill Last Dose   • acetaminophen (TYLENOL) 500 MG tablet Take 1,000 mg by mouth Every 6 (Six) Hours As Needed for Mild Pain .   Past Month at Unknown time   • amLODIPine (NORVASC) 2.5 MG tablet Take 2.5 mg by mouth Every Morning.   12/8/2020 at Unknown time   • oxyCODONE-acetaminophen (PERCOCET) 5-325 MG per tablet Take 1 tablet by mouth Every 4 (Four) Hours As Needed for Moderate Pain . 12 tablet 0 12/7/2020 at Unknown time   • Synthroid 75 MCG tablet Take 75 mcg by mouth Daily.   12/8/2020 at Unknown time   • venlafaxine XR (EFFEXOR-XR) 150 MG 24 hr capsule Take 150 mg by mouth Daily.   12/7/2020 at Unknown time         PMH:   Past Medical History:   Diagnosis Date   • Depression    • Hypertension    • Hypothyroidism    • Sleep apnea     CPAP AT HOME      PSH:    Past Surgical History:   Procedure Laterality Date   • CERVICAL CONE BIOPSY     • COLONOSCOPY     • LUMBAR LAMINECTOMY DISCECTOMY DECOMPRESSION     • TONSILLECTOMY         Immunization History:  Influenza: 2020  Pneumococcal: Yes  Tetanus: No      Social History:   Tobacco:   Social History     Tobacco Use  "  Smoking Status Never Smoker   Smokeless Tobacco Never Used      Alcohol:     Social History     Substance and Sexual Activity   Alcohol Use Not Currently    Comment: occasionally         Physical Exam:/69 (BP Location: Right arm, Patient Position: Lying)   Pulse 87   Temp 98 °F (36.7 °C) (Tympanic)   Resp 18   Ht 157.5 cm (62\")   Wt 96.2 kg (212 lb)   SpO2 93%   BMI 38.78 kg/m²       General Appearance:    Alert, cooperative, no distress, appears stated age   Head:    Normocephalic, without obvious abnormality, atraumatic   Lungs:     Clear to auscultation bilaterally, respirations unlabored    Heart:   Regular rate and rhythm, S1 and S2 normal, no murmur, rub    or gallop    Abdomen:    Soft without tenderness   Breast Exam:    deferred   Genitalia:    deferred   Extremities:   Extremities normal, atraumatic, no cyanosis or edema   Skin:   Skin color, texture, turgor normal, no rashes or lesions   Neurologic:   Grossly intact     Results Review:     LABS:  Lab Results   Component Value Date    WBC 10.98 (H) 12/04/2020    HGB 12.8 12/04/2020    HCT 40.8 12/04/2020    MCV 89.9 12/04/2020     12/04/2020    NEUTROABS 7.45 (H) 12/04/2020    GLUCOSE 84 12/04/2020    BUN 14 12/04/2020    CREATININE 0.69 12/04/2020    EGFRIFNONA 85 12/04/2020     12/04/2020    K 3.9 12/04/2020     12/04/2020    CO2 25.0 12/04/2020    CALCIUM 9.8 12/04/2020    ALBUMIN 4.10 12/04/2020    AST 21 12/04/2020    ALT 20 12/04/2020    BILITOT 0.4 12/04/2020 12/4/2020  Urine Culture - Urine, Urine, Clean Catch  Order: [id number] - Reflex for Order [id number]  Status:  Final result   Visible to patient:  No (not released) Next appt:  None  Specimen Information: Urine, Clean Catch        Urine Culture <10,000 CFU/mL Gram Negative BacilliAbnormal                  RADIOLOGY:  Study Result    EXAMINATION: XR SHOULDER 2+ VW RIGHT-, XR SPINE CERVICAL 2 VW-      INDICATION: FALL SHOULDER PAIN      COMPARISON: NONE   "   FINDINGS: Comminuted minimally displaced acute fracture of the right  humerus neck. Acromioclavicular joint is unremarkable. Humeral head  appears grossly well seated in the glenoid.     Cervical vertebral body heights are maintained, without evidence of  acute fracture or subluxation. Spondylosis changes are evident and most  advanced at C3-C4. Prevertebral soft tissues are unremarkable.     IMPRESSION:  Comminuted minimally displaced acute fracture of the right  humerus neck. Acromioclavicular joint is unremarkable. Humeral head  appears grossly well seated in the glenoid.     No acute fracture or subluxation of the cervical spine. Multilevel  cervical spondylosis changes which appear most advanced at C3-4.       I reviewed the patient's new clinical results.    Cancer Staging (if applicable)  Cancer Patient: __ yes __no __unknown; If yes, clinical stage T:__ N:__M:__, stage group or __N/A      Impression: Comminuted minimally displaced acute fracture of the right  humerus neck      Plan: Right humerus proximal open reduction internal fixation      HANY Rai   12/8/2020   11:55 EST

## 2020-12-08 NOTE — ANESTHESIA POSTPROCEDURE EVALUATION
Patient: Génesis Bañuelos    Procedure Summary     Date: 12/08/20 Room / Location:  [facility] /  [facility]    Anesthesia Start: 1404 Anesthesia Stop: 1710    Procedure: RIGHT HUMERUS PROXIMAL OPEN REDUCTION INTERNAL FIXATION (Right Arm Upper) Diagnosis:       Proximal humeral fracture      (proximal humeral fracture)    Surgeon: Moris Acosta MD Provider: Lucas Carrion Jr., MD    Anesthesia Type: general with block ASA Status: 3          Anesthesia Type: general with block    Vitals  Vitals Value Taken Time   /61 12/08/20 1707   Temp     Pulse     Resp     SpO2 93 % 12/08/20 1708   Vitals shown include unvalidated device data.        Post Anesthesia Care and Evaluation    Patient location during evaluation: PACU  Patient participation: complete - patient participated  Level of consciousness: sleepy but conscious  Pain management: adequate  Airway patency: patent  Anesthetic complications: No anesthetic complications  PONV Status: none  Cardiovascular status: hemodynamically stable and acceptable  Respiratory status: nonlabored ventilation, acceptable, nasal cannula and oral airway  Hydration status: acceptable

## 2020-12-08 NOTE — ANESTHESIA PREPROCEDURE EVALUATION
Anesthesia Evaluation     Patient summary reviewed and Nursing notes reviewed                Airway   Mallampati: II  TM distance: >3 FB  Neck ROM: full  No difficulty expected  Dental - normal exam     Pulmonary - normal exam   (+) sleep apnea on CPAP,   Cardiovascular - normal exam    (+) hypertension,       Neuro/Psych- negative ROS  GI/Hepatic/Renal/Endo    (+) morbid obesity,  thyroid problem hypothyroidism    Musculoskeletal (-) negative ROS    Abdominal  - normal exam    Bowel sounds: normal.   Substance History - negative use     OB/GYN negative ob/gyn ROS         Other                        Anesthesia Plan    ASA 3     general with block   (Peripheral nerve block + cath for post op pain relief)  intravenous induction     Anesthetic plan, all risks, benefits, and alternatives have been provided, discussed and informed consent has been obtained with: patient.    Plan discussed with CRNA.

## 2020-12-09 VITALS
RESPIRATION RATE: 16 BRPM | WEIGHT: 212 LBS | BODY MASS INDEX: 39.01 KG/M2 | DIASTOLIC BLOOD PRESSURE: 76 MMHG | OXYGEN SATURATION: 94 % | HEART RATE: 84 BPM | SYSTOLIC BLOOD PRESSURE: 126 MMHG | TEMPERATURE: 98.3 F | HEIGHT: 62 IN

## 2020-12-09 PROBLEM — S42.209A PROXIMAL HUMERUS FRACTURE: Status: ACTIVE | Noted: 2020-12-09

## 2020-12-09 PROBLEM — D72.829 LEUKOCYTOSIS: Status: ACTIVE | Noted: 2020-12-09

## 2020-12-09 PROBLEM — G89.18 ACUTE POSTOPERATIVE PAIN: Status: ACTIVE | Noted: 2020-12-09

## 2020-12-09 LAB
ANION GAP SERPL CALCULATED.3IONS-SCNC: 8 MMOL/L (ref 5–15)
BASOPHILS # BLD AUTO: 0.01 10*3/MM3 (ref 0–0.2)
BASOPHILS NFR BLD AUTO: 0.1 % (ref 0–1.5)
BUN SERPL-MCNC: 13 MG/DL (ref 8–23)
BUN/CREAT SERPL: 19.7 (ref 7–25)
CALCIUM SPEC-SCNC: 9.2 MG/DL (ref 8.6–10.5)
CHLORIDE SERPL-SCNC: 105 MMOL/L (ref 98–107)
CO2 SERPL-SCNC: 25 MMOL/L (ref 22–29)
CREAT SERPL-MCNC: 0.66 MG/DL (ref 0.57–1)
DEPRECATED RDW RBC AUTO: 44.2 FL (ref 37–54)
EOSINOPHIL # BLD AUTO: 0 10*3/MM3 (ref 0–0.4)
EOSINOPHIL NFR BLD AUTO: 0 % (ref 0.3–6.2)
ERYTHROCYTE [DISTWIDTH] IN BLOOD BY AUTOMATED COUNT: 13.2 % (ref 12.3–15.4)
GFR SERPL CREATININE-BSD FRML MDRD: 89 ML/MIN/1.73
GLUCOSE SERPL-MCNC: 134 MG/DL (ref 65–99)
HCT VFR BLD AUTO: 37 % (ref 34–46.6)
HGB BLD-MCNC: 11.5 G/DL (ref 12–15.9)
IMM GRANULOCYTES # BLD AUTO: 0.08 10*3/MM3 (ref 0–0.05)
IMM GRANULOCYTES NFR BLD AUTO: 0.6 % (ref 0–0.5)
LYMPHOCYTES # BLD AUTO: 0.96 10*3/MM3 (ref 0.7–3.1)
LYMPHOCYTES NFR BLD AUTO: 6.8 % (ref 19.6–45.3)
MCH RBC QN AUTO: 28.5 PG (ref 26.6–33)
MCHC RBC AUTO-ENTMCNC: 31.1 G/DL (ref 31.5–35.7)
MCV RBC AUTO: 91.8 FL (ref 79–97)
MONOCYTES # BLD AUTO: 1.03 10*3/MM3 (ref 0.1–0.9)
MONOCYTES NFR BLD AUTO: 7.3 % (ref 5–12)
NEUTROPHILS NFR BLD AUTO: 12.08 10*3/MM3 (ref 1.7–7)
NEUTROPHILS NFR BLD AUTO: 85.2 % (ref 42.7–76)
NRBC BLD AUTO-RTO: 0 /100 WBC (ref 0–0.2)
PLATELET # BLD AUTO: 321 10*3/MM3 (ref 140–450)
PMV BLD AUTO: 9 FL (ref 6–12)
POTASSIUM SERPL-SCNC: 5 MMOL/L (ref 3.5–5.2)
RBC # BLD AUTO: 4.03 10*6/MM3 (ref 3.77–5.28)
SODIUM SERPL-SCNC: 138 MMOL/L (ref 136–145)
WBC # BLD AUTO: 14.16 10*3/MM3 (ref 3.4–10.8)

## 2020-12-09 PROCEDURE — 80048 BASIC METABOLIC PNL TOTAL CA: CPT | Performed by: ORTHOPAEDIC SURGERY

## 2020-12-09 PROCEDURE — 97530 THERAPEUTIC ACTIVITIES: CPT

## 2020-12-09 PROCEDURE — 25010000002 CEFAZOLIN PER 500 MG: Performed by: ORTHOPAEDIC SURGERY

## 2020-12-09 PROCEDURE — 97166 OT EVAL MOD COMPLEX 45 MIN: CPT

## 2020-12-09 PROCEDURE — 97535 SELF CARE MNGMENT TRAINING: CPT

## 2020-12-09 PROCEDURE — 63710000001 ACETAMINOPHEN 325 MG TABLET: Performed by: ORTHOPAEDIC SURGERY

## 2020-12-09 PROCEDURE — A9270 NON-COVERED ITEM OR SERVICE: HCPCS | Performed by: ORTHOPAEDIC SURGERY

## 2020-12-09 PROCEDURE — 97161 PT EVAL LOW COMPLEX 20 MIN: CPT

## 2020-12-09 PROCEDURE — 63710000001 VENLAFAXINE XR 75 MG CAPSULE SUSTAINED-RELEASE 24 HR: Performed by: ORTHOPAEDIC SURGERY

## 2020-12-09 PROCEDURE — A9270 NON-COVERED ITEM OR SERVICE: HCPCS | Performed by: INTERNAL MEDICINE

## 2020-12-09 PROCEDURE — 97116 GAIT TRAINING THERAPY: CPT

## 2020-12-09 PROCEDURE — 63710000001 AMLODIPINE 2.5 MG TABLET: Performed by: INTERNAL MEDICINE

## 2020-12-09 PROCEDURE — 85025 COMPLETE CBC W/AUTO DIFF WBC: CPT | Performed by: ORTHOPAEDIC SURGERY

## 2020-12-09 PROCEDURE — 97110 THERAPEUTIC EXERCISES: CPT

## 2020-12-09 PROCEDURE — 63710000001 LEVOTHYROXINE 75 MCG TABLET: Performed by: ORTHOPAEDIC SURGERY

## 2020-12-09 RX ORDER — DOCUSATE SODIUM 100 MG/1
100 CAPSULE, LIQUID FILLED ORAL 2 TIMES DAILY
Qty: 40 CAPSULE | Refills: 0 | OUTPATIENT
Start: 2020-12-09 | End: 2021-04-30

## 2020-12-09 RX ORDER — OXYCODONE HYDROCHLORIDE 5 MG/1
5 TABLET ORAL EVERY 4 HOURS PRN
Qty: 40 TABLET | Refills: 0 | Status: SHIPPED | OUTPATIENT
Start: 2020-12-09 | End: 2020-12-18

## 2020-12-09 RX ADMIN — CEFAZOLIN SODIUM 2 G: 10 INJECTION, POWDER, FOR SOLUTION INTRAVENOUS at 05:50

## 2020-12-09 RX ADMIN — ACETAMINOPHEN 650 MG: 325 TABLET, FILM COATED ORAL at 09:18

## 2020-12-09 RX ADMIN — VENLAFAXINE HYDROCHLORIDE 150 MG: 75 CAPSULE, EXTENDED RELEASE ORAL at 09:14

## 2020-12-09 RX ADMIN — LEVOTHYROXINE SODIUM 75 MCG: 75 TABLET ORAL at 09:15

## 2020-12-09 RX ADMIN — AMLODIPINE BESYLATE 2.5 MG: 2.5 TABLET ORAL at 09:15

## 2020-12-09 NOTE — DISCHARGE SUMMARY
Patient Name: Génesis Bañuelos  MRN: [id number]  : 1955  DOS: 2020    Attending: Moris Acosta MD    Primary Care Provider: Karli Maradiaga APRN    Date of Admission:.2020 10:52 AM    Date of Discharge:  2020    Discharge Diagnosis:   S/P ORIF (open reduction internal fixation) fracture ,right proximal humerus fracture    Proximal humeral fracture    Obesity (BMI 30-39.9)    Essential hypertension    JUAN ANTONIO on CPAP    Proximal humerus fracture    Leukocytosis, mild, likely reactive    Acute postoperative pain      Hospital Course    At admit:    Patient is a pleasant 65 y.o. female presented for scheduled surgery by Dr. Acosta     Patient had a fall on 2020, at that time she was seen at the emergency department  at [facility], she was diagnosed with right proximal humerus fracture.  She was noted to have a urinary tract infection preadmission testing on 2020, was started on antibiotics.  Urine culture nonetheless showed less than 10,000 colony-forming units of gram-negative bacilli.     She underwent open reduction internal fixation of right proximal humerus under GA and a block, tolerated surgery well, was admitted for further management.     Seen in her room postoperatively, she is doing well with good pain control.  No difficulty breathing.  No nausea, vomiting, or shortness of breath.  Tolerated p.o. diet.    After admit:    Patient was provided pain medications as needed for pain control, along with interscalene nerve block infusion of Ropivacaine.    Adjustments were made to pain medications to optimize postop pain management.   Risks and benefits of opiate medications discussed with patient.  Oscar report in chart was reviewed prior to discharge.    The patient was seen by OT and was taught exercises for her right arm.  The patient used an IS for atelectasis prophylaxis and mechanicals for DVT prophylaxis.    Home medications were resumed as  "appropriate, and labs were monitored and remained fairly stable.     With the progress she has made, she is ready for DC home today.      The patient will have an arrow pump ( instructed on it during this admit)  Discussed with patient regarding plan and she shows understanding and agreement.        Procedures Performed    Preoperative Diagnosis:right proximal humerus fracture, Neer 3 part     Postoperative Diagnosis: Same     Procedure: ORIF right proximal humerus fracture     Surgeon: Dr. Moris Acosta    Pertinent Test Results:    I reviewed the patient's new clinical results.   Results from last 7 days   Lab Units 20  0519 20  1209   WBC 10*3/mm3 14.16* 10.98*   HEMOGLOBIN g/dL 11.5* 12.8   HEMATOCRIT % 37.0 40.8   PLATELETS 10*3/mm3 321 303     Results from last 7 days   Lab Units 20  0519 20  1209   SODIUM mmol/L 138 139   POTASSIUM mmol/L 5.0 3.9   CHLORIDE mmol/L 105 104   CO2 mmol/L 25.0 25.0   BUN mg/dL 13 14   CREATININE mg/dL 0.66 0.69   CALCIUM mg/dL 9.2 9.8   BILIRUBIN mg/dL  --  0.4   ALK PHOS U/L  --  109   ALT (SGPT) U/L  --  20   AST (SGOT) U/L  --  21   GLUCOSE mg/dL 134* 84     I reviewed the patient's new imaging including images and reports.      Physical therapy: Patient ambulated 300 feet with CGA and no LOB/unsteadiness, limited by fatigue and SOA. O2 sats 91% on room air with gait. Patient climbed 10 steps with HHA and one handrail with no difficulty. Patient has been d/c home with family.      Discharge Assessment:    Vital Signs  Visit Vitals  /66 (Patient Position: Lying)   Pulse 82   Temp 98.3 °F (36.8 °C) (Oral)   Resp 16   Ht 157.5 cm (62\")   Wt 96.2 kg (212 lb)   SpO2 93%   BMI 38.78 kg/m²     Temp (24hrs), Av.1 °F (36.7 °C), Min:97.6 °F (36.4 °C), Max:98.5 °F (36.9 °C)      General Appearance:    Alert, cooperative, in no acute distress   Lungs:     Clear to auscultation,respirations regular, even and   unlabored    Heart:    Regular rhythm and " normal rate, normal S1 and S2    Abdomen:     Normal bowel sounds, no masses, no organomegaly, soft   non-tender, non-distended, no guarding, no rebound tenderness   Extremities:   RUE in a sling, CDI dressing. Interscalene nerve block cath present. Distal pulses, cap refill, movements of fingers, wrist, intact.   Pulses:   Pulses palpable and equal bilaterally   Skin:   No bleeding, bruising or rash   Neurologic:   Cranial nerves 2 - 12 grossly intact       Discharge Disposition: Home          Discharge Medications      New Medications      Instructions Start Date   docusate sodium 100 MG capsule  Commonly known as: Colace   100 mg, Oral, 2 Times Daily      oxyCODONE 5 MG immediate release tablet  Commonly known as: ROXICODONE   5 mg, Oral, Every 4 Hours PRN      Ropivacine HCl-NaCl  Commonly known as: NAROPIN   6 mL/hr (12 mg/hr), Peripheral Nerve, Continuous         Continue These Medications      Instructions Start Date   amLODIPine 2.5 MG tablet  Commonly known as: NORVASC   2.5 mg, Oral, Every Morning      Synthroid 75 MCG tablet  Generic drug: levothyroxine   75 mcg, Oral, Daily      TYLENOL 500 MG tablet  Generic drug: acetaminophen   1,000 mg, Oral, Every 6 Hours PRN      venlafaxine  MG 24 hr capsule  Commonly known as: EFFEXOR-XR   150 mg, Oral, Daily         Stop These Medications    oxyCODONE-acetaminophen 5-325 MG per tablet  Commonly known as: PERCOCET            Discharge Diet: R  Diet Instructions     REGULAR DIET               Activity at Discharge: RUE sling, NWB  Activity Instructions     SLING TO AFFECTED SHOULDER AT ALL TIMES EXCEPT HYGIENE AND MAY REMOVE STRAPS WHEN SEATED               Follow-up Appointments  Dr. Acosta per his orders      HANY Rai  12/09/20  10:55 EST

## 2020-12-09 NOTE — PROGRESS NOTES
Case Management Discharge Note      Final Note: Plan is home with family.  will transport. Denies any discharge needs.         Selected Continued Care - Admitted Since 12/8/2020     Destination    No services have been selected for the patient.              Durable Medical Equipment    No services have been selected for the patient.              Dialysis/Infusion    No services have been selected for the patient.              Home Medical Care    No services have been selected for the patient.              Therapy    No services have been selected for the patient.              Community Resources    No services have been selected for the patient.                       Final Discharge Disposition Code: 01 - home or self-care

## 2020-12-09 NOTE — PROGRESS NOTES
Orthopedic Progress Note      Patient: Génesis Bañuelos  YOB: 1955     Date of Admission: 12/8/2020 10:52 AM Medical Record Number: [id number]     Attending Physician: Moris Acosta MD    Status Post:  Procedure(s):  HUMERUS PROXIMAL OPEN REDUCTION INTERNAL FIXATION RIGHT Post Operative Day Number: 1    Subjective : No new orthopaedic complaints     Pain Relief: complete resolution with present medication.     Systemic Complaints: No Complaints  Vitals:    12/08/20 1951 12/09/20 0156 12/09/20 0536 12/09/20 0700   BP: 121/74 119/67 134/83 143/81   BP Location: Left arm   Right arm   Patient Position: Lying   Lying   Pulse: 91 92 84 77   Resp: 16 16 16 16   Temp: 98.2 °F (36.8 °C) 98.3 °F (36.8 °C) 98.1 °F (36.7 °C) 98.3 °F (36.8 °C)   TempSrc: Oral Oral Oral Oral   SpO2: 94% 94% 92% 93%   Weight:       Height:           Physical Exam: 65 y.o. female    General Appearance:       Alert, cooperative, in no acute distress                  Extremities:    Dressing Clean, Dry and Intact             No clinical sign of DVT        Able to do good movements of digits    Pulses:   Pulses palpable and equal bilaterally           Diagnostic Tests:     Results from last 7 days   Lab Units 12/09/20  0519 12/04/20  1209   WBC 10*3/mm3 14.16* 10.98*   HEMOGLOBIN g/dL 11.5* 12.8   HEMATOCRIT % 37.0 40.8   PLATELETS 10*3/mm3 321 303     Results from last 7 days   Lab Units 12/09/20  0519 12/04/20  1209   SODIUM mmol/L 138 139   POTASSIUM mmol/L 5.0 3.9   CHLORIDE mmol/L 105 104   CO2 mmol/L 25.0 25.0   BUN mg/dL 13 14   CREATININE mg/dL 0.66 0.69   GLUCOSE mg/dL 134* 84   CALCIUM mg/dL 9.2 9.8     Results from last 7 days   Lab Units 12/04/20  1209   INR  1.09   APTT seconds 32.6     No results found for: URICACID  No results found for: CRYSTAL  Microbiology Results (last 10 days)     Procedure Component Value - Date/Time    COVID PRE-OP / PRE-PROCEDURE SCREENING ORDER (NO ISOLATION) - Swab, Nasopharynx  [[id number]] Collected: 12/06/20 0937    Lab Status: Final result Specimen: Swab from Nasopharynx Updated: 12/06/20 2318    Narrative:      The following orders were created for panel order COVID PRE-OP / PRE-PROCEDURE SCREENING ORDER (NO ISOLATION) - Swab, Nasopharynx.  Procedure                               Abnormality         Status                     ---------                               -----------         ------                     COVID-19 PCR, [facility]...[[id number]]                      Final result                 Please view results for these tests on the individual orders.    COVID-19 PCR, [facility], NP SWAB IN [facility]AL TRANSPORT MEDIA 24-30 HR TAT - Swab, Nasopharynx [[id number]] Collected: 12/06/20 0937    Lab Status: Final result Specimen: Swab from Nasopharynx Updated: 12/06/20 2318     SARS-CoV-2 CHER Not Detected    Urine Culture - Urine, Urine, Clean Catch [[id number]]  (Abnormal) Collected: 12/04/20 1209    Lab Status: Final result Specimen: Urine, Clean Catch Updated: 12/05/20 1205     Urine Culture <10,000 CFU/mL Gram Negative Bacilli    Narrative:      Call if further workup needed.          Fl C Arm During Surgery    Result Date: 12/8/2020  Intraoperative fluoroscopy utilized during right humerus ORIF        Xr Chest Pa & Lateral    Result Date: 12/7/2020  No acute cardiopulmonary process. Right proximal humerus fracture as seen on prior comparison 11/29/2020.  DICTATED:   12/04/2020 EDITED/ls :   12/05/2020  This report was finalized on 12/7/2020 11:12 AM by Dr. Weston Best.          Current Medications:  Scheduled Meds:amLODIPine, 2.5 mg, Oral, Daily  levothyroxine, 75 mcg, Oral, Daily  venlafaxine XR, 150 mg, Oral, Daily      Continuous Infusions:Ropivacine HCl-NaCl, 6 mL/hr, Last Rate: 6 mL/hr (12/08/20 1658)  sodium chloride, 50 mL/hr, Last Rate: 50 mL/hr (12/08/20 1824)      PRN Meds:.•  acetaminophen **OR** acetaminophen  •  HYDROmorphone **AND** naloxone  •  labetalol  •   ondansetron **OR** ondansetron  •  oxyCODONE  •  oxyCODONE  •  sodium chloride    Assessment: Status post  HUMERUS PROXIMAL OPEN REDUCTION INTERNAL FIXATION right    Patient Active Problem List   Diagnosis   • Proximal humeral fracture   • S/P ORIF (open reduction internal fixation) fracture ,right proximal humerus fracture   • Obesity (BMI 30-39.9)   • Essential hypertension   • JUAN ANTONIO on CPAP   • Proximal humerus fracture       PLAN:   Continues current post-op course  Mobilize with PT as tolerated per protocol  Sling except for elbow wrist and hand range of motion  Dressing in place until follow-up    Weight Bearing: NWB  Discharge Plan: OK to plan for discharge in  today to home  from orthopadic perspective.    Sushil Carpenter MD    Date: 12/9/2020    Time: 08:58 EST

## 2020-12-09 NOTE — PROGRESS NOTES
[facility]    Acute pain service Inpatient Progress Note    Patient Name: Génesis Bañuelos  :  1955  MRN:  [id number]        Acute Pain  Service Inpatient Progress Note:    Analgesia:Excellent  Pain Score:1/10  LOC: alert and awake  Side Effects:None  Catheter Site:clean, dry and dressing intact  Cath type: peripheral nerve cath with ON Q  Infusion rate: 6ml/hr  Catheter Plan:Catheter to remain Insitu and Continue catheter infusion rate unchanged

## 2020-12-09 NOTE — H&P
Patient Name: Génesis Bañuelos  MRN: [id number]  : 1955  DOS: 2020    Attending: Moris Acosta MD    Primary Care Provider: Karli Maradiaga APRN      Chief complaint: Right proximal humerus fracture    Subjective   Patient is a pleasant 65 y.o. female presented for scheduled surgery by Dr. Acosta    Patient had a fall on 2020, at that time she was seen at the emergency department  at [facility], she was diagnosed with right proximal humerus fracture.  She was noted to have a urinary tract infection preadmission testing on 2020, was started on antibiotics.  Urine culture nonetheless showed less than 10,000 colony-forming units of gram-negative bacilli.    She underwent open reduction internal fixation of right proximal humerus under GA and a block, tolerated surgery well, was admitted for further management.    Seen in her room postoperatively, she is doing well with good pain control.  No difficulty breathing.  No nausea, vomiting, or shortness of breath.  Tolerated p.o. diet.      Allergies   Allergen Reactions   • Penicillins Rash       Meds:  Medications Prior to Admission   Medication Sig Dispense Refill Last Dose   • acetaminophen (TYLENOL) 500 MG tablet Take 1,000 mg by mouth Every 6 (Six) Hours As Needed for Mild Pain .   Past Month at Unknown time   • amLODIPine (NORVASC) 2.5 MG tablet Take 2.5 mg by mouth Every Morning.   2020 at Unknown time   • oxyCODONE-acetaminophen (PERCOCET) 5-325 MG per tablet Take 1 tablet by mouth Every 4 (Four) Hours As Needed for Moderate Pain . 12 tablet 0 2020 at Unknown time   • Synthroid 75 MCG tablet Take 75 mcg by mouth Daily.   2020 at Unknown time   • venlafaxine XR (EFFEXOR-XR) 150 MG 24 hr capsule Take 150 mg by mouth Daily.   2020 at Unknown time         History:   Past Medical History:   Diagnosis Date   • Depression    • Hypertension    • Hypothyroidism    • Sleep apnea     CPAP AT HOME   "    Past Surgical History:   Procedure Laterality Date   • CERVICAL CONE BIOPSY     • COLONOSCOPY     • LUMBAR LAMINECTOMY DISCECTOMY DECOMPRESSION     • TONSILLECTOMY       History reviewed. No pertinent family history.  Social History     Tobacco Use   • Smoking status: Never Smoker   • Smokeless tobacco: Never Used   Substance Use Topics   • Alcohol use: Not Currently     Comment: occasionally   • Drug use: Defer   .  Retired    Review of Systems  Pertinent items are noted in HPI, all other systems reviewed and negative    Vital Signs  /80 (BP Location: Left arm, Patient Position: Lying)   Pulse 110   Temp 98.4 °F (36.9 °C) (Oral)   Resp 16   Ht 157.5 cm (62\")   Wt 96.2 kg (212 lb)   SpO2 91%   BMI 38.78 kg/m²     Physical Exam:    General Appearance:    Alert, cooperative, in no acute distress   Head:    Normocephalic, without obvious abnormality, atraumatic   Eyes:            Lids and lashes normal, conjunctivae and sclerae normal, no   icterus, no pallor, corneas clear,    Ears:    Ears appear intact with no abnormalities noted   Throat:   No oral lesions, no thrush, oral mucosa moist   Neck:   No adenopathy, supple, trachea midline, no thyromegaly         Lungs:     Clear to auscultation,respirations regular, even and                   unlabored    Heart:    Regular rhythm and normal rate, normal S1 and S2, no      murmur    Abdomen:     Normal bowel sounds, no masses, no organomegaly, soft        non-tender, non-distended, no guarding, no rebound                 tenderness   Genitalia:    Deferred   Extremities:  Right UE in a sling, CDI  dressing  shoulder. Interscalene nerve block cath present.  Distal pulses, cap refill, movements of fingers, wrist, intact.  Slight numbness related to block.     Pulses:   Pulses palpable and equal bilaterally   Skin:   No bleeding, bruising or rash           I reviewed the patient's new clinical results.       Results from last 7 days   Lab Units " 12/04/20  1209   WBC 10*3/mm3 10.98*   HEMOGLOBIN g/dL 12.8   HEMATOCRIT % 40.8   PLATELETS 10*3/mm3 303     Results from last 7 days   Lab Units 12/04/20  1209   SODIUM mmol/L 139   POTASSIUM mmol/L 3.9   CHLORIDE mmol/L 104   CO2 mmol/L 25.0   BUN mg/dL 14   CREATININE mg/dL 0.69   CALCIUM mg/dL 9.8   BILIRUBIN mg/dL 0.4   ALK PHOS U/L 109   ALT (SGPT) U/L 20   AST (SGOT) U/L 21   GLUCOSE mg/dL 84     Lab Results   Component Value Date    HGBA1C 5.70 (H) 12/04/2020     Assessment and Plan:       S/P ORIF (open reduction internal fixation) fracture ,right proximal humerus fracture    Proximal humeral fracture    Obesity (BMI 30-39.9)    Essential hypertension    JUAN ANTONIO on CPAP  Recent urinary tract infection  Elevated hemoglobin A1c    Plan    1. PT/OT. NWB, right UE, ROM hand, wrist, elbow.  2. Pain control-prns, interscalene nerve block cath with ropivacaine infusion.   3. IS-encourage  4. DVT proph- Mech/ mobilize.  5. Bowel regimen  6. Resume home medications as appropriate  7. Monitor post-op labs  8. DC planning for home    - Hypertension:  Resume home medications as appropriate, formulary substitution when indicated.  Holding parameters.  Prn medications for elevated blood pressure.    -JUAN ANTONIO:  Continue CPAP.   Monitor O2 sats.    Discussed with patient and her  postop management plan, she expressed understanding and agreement.    Dragon disclaimer:  Part of this encounter note is an electronic transcription/translation of spoken language to printed text. The electronic translation of spoken language may permit erroneous, or at times, nonsensical words or phrases to be inadvertently transcribed; Although I have reviewed the note for such errors, some may still exist.    Francisco Huitron MD  12/08/20  19:30 EST

## 2020-12-09 NOTE — THERAPY DISCHARGE NOTE
Patient Name: Génesis Bañuelos  : 1955    MRN: [id number]                              Today's Date: 2020       Admit Date: 2020    Visit Dx:     ICD-10-CM ICD-9-CM   1. S/P ORIF (open reduction internal fixation) fracture ,right proximal humerus fracture  Z98.890 V45.89    Z87.81 V15.51   2. Fall, initial encounter  W19.XXXA E888.9   3. Closed displaced fracture of surgical neck of right humerus, unspecified fracture morphology, initial encounter  S42.211A 812.01     Patient Active Problem List   Diagnosis   • Proximal humeral fracture   • S/P ORIF (open reduction internal fixation) fracture ,right proximal humerus fracture   • Obesity (BMI 30-39.9)   • Essential hypertension   • JUAN ANTONIO on CPAP   • Proximal humerus fracture     Past Medical History:   Diagnosis Date   • Depression    • Hypertension    • Hypothyroidism    • Sleep apnea     CPAP AT HOME      Past Surgical History:   Procedure Laterality Date   • CERVICAL CONE BIOPSY     • COLONOSCOPY     • LUMBAR LAMINECTOMY DISCECTOMY DECOMPRESSION     • ORIF HUMERUS FRACTURE Right 2020    Procedure: HUMERUS PROXIMAL OPEN REDUCTION INTERNAL FIXATION RIGHT;  Surgeon: Moris Acosta MD;  [facility];  Service: Orthopedics;  Laterality: Right;   • TONSILLECTOMY       General Information     Row Name 20 0955          Physical Therapy Time and Intention    Document Type  evaluation;discharge treatment;therapy note (daily note)  -LR     Mode of Treatment  physical therapy  -LR     Row Name 20 0955          General Information    Patient Profile Reviewed  yes  -LR     Prior Level of Function  independent:;all household mobility;community mobility;gait;transfer;bed mobility;ADL's;using stairs  -LR     Existing Precautions/Restrictions  fall;brace worn when out of bed;right;shoulder;non-weight bearing;other (see comments) R shoulder NWB, sling to R UE, R interscalene nerve catheter  -LR     Barriers to Rehab  none identified   -LR     Row Name 12/09/20 0955          Living Environment    Lives With  spouse can assist at all times upon d/c home  -LR     Row Name 12/09/20 0955          Home Main Entrance    Number of Stairs, Main Entrance  three  -LR     Stair Railings, Main Entrance  railing on right side (ascending)  -LR     Row Name 12/09/20 0955          Stairs Within Home, Primary    Stairs, Within Home, Primary  17  -LR     Stair Railings, Within Home, Primary  railing on right side (ascending)  -LR     Row Name 12/09/20 0955          Cognition    Orientation Status (Cognition)  oriented x 4  -LR     Row Name 12/09/20 0955          Safety Issues, Functional Mobility    Safety Issues Affecting Function (Mobility)  other (see comments) none  -LR     Impairments Affecting Function (Mobility)  strength;endurance/activity tolerance;shortness of breath  -LR       User Key  (r) = Recorded By, (t) = Taken By, (c) = Cosigned By    Initials Name Provider Type    LR Светлана Paredes, PT Physical Therapist        Mobility     Row Name 12/09/20 0955          Bed Mobility    Comment (Bed Mobility)  [facility] on arrival and at end of eval.  -LR     Row Name 12/09/20 0955          Transfers    Comment (Transfers)  Verbal cues to push up from chair with L UE to stand and to reach back for chair with L UE to lower into sitting. No LOB or unsteadiness with t/f. Denied dizziness upon standing up.  -LR     Row Name 12/09/20 0955          Sit-Stand Transfer    Sit-Stand [place] (Transfers)  verbal cues;standby assist  -LR     Assistive Device (Sit-Stand Transfers)  other (see comments) no AD  -LR     Row Name 12/09/20 0955          Gait/Stairs (Locomotion)    [place] Level (Gait)  verbal cues;contact guard  -LR     Assistive Device (Gait)  other (see comments);cane, straight no AD except for use of SPC for last 30 feet  -LR     Distance in Feet (Gait)  300  -LR     Deviations/Abnormal Patterns (Gait)  bilateral deviations;[place] decreased;gait  speed decreased;stride length decreased  -LR     Bilateral Gait Deviations  heel strike decreased  -LR     [place] Level (Stairs)  verbal cues;contact guard  -LR     Handrail Location (Stairs)  right side (descending)  -LR     Number of Steps (Stairs)  10  -LR     Ascending Technique (Stairs)  step-over-step  -LR     Descending Technique (Stairs)  step-over-step  -LR     Comment (Gait/Stairs)  Patient ambulated with step through gait pattern at slow pace. No LOB or unsteadiness with gait. Verbal cues to avoid tight turns and doorfacings on the right to avoid hitting R UE into obstacles. Verbal cues for correct sequencing and placement of SPC for last 30 feet of gait. Gait limited by SOA, O2 sats at 91%. Patient climbed stairs reciprocally despite cues to take them one at a time and had no difficulty.  -LR     Row Name 12/09/20 0955          Mobility    Extremity Weight-bearing Status  right upper extremity  -LR     Right Upper Extremity (Weight-bearing Status)  (S) non weight-bearing (NWB)  -LR       User Key  (r) = Recorded By, (t) = Taken By, (c) = Cosigned By    Initials Name Provider Type    LR Светлана Paredes, PT Physical Therapist        Obj/Interventions     Row Name 12/09/20 0955          Range of Motion Comprehensive    General Range of Motion  no range of motion deficits identified  -LR     Comment, General Range of Motion  LE AROM WFL  -LR     Row Name 12/09/20 0955          Strength Comprehensive (MMT)    General Manual Muscle Testing (MMT) Assessment  no strength deficits identified  -LR     Comment, General Manual Muscle Testing (MMT) Assessment  B LEs functionally 4+/5, no knee buckling with gait  -LR     Row Name 12/09/20 0955          Motor Skills    Therapeutic Exercise  -- defer HEP to OT  -LR     Row Name 12/09/20 0955          Balance    Balance Assessment  sitting static balance;sitting dynamic balance;standing static balance;standing dynamic balance  -LR     Static Sitting Balance   WFL;unsupported;sitting in chair  -LR     Dynamic Sitting Balance  WFL;unsupported;sitting in chair  -LR     Static Standing Balance  WFL;unsupported;standing  -LR     Dynamic Standing Balance  WFL;unsupported;standing  -LR     Row Name 12/09/20 0955          Sensory Assessment (Somatosensory)    Sensory Assessment (Somatosensory)  other (see comments) reports numbness in R UE/hand d/t nerve catheter; can move fingers of R hand  -LR       User Key  (r) = Recorded By, (t) = Taken By, (c) = Cosigned By    Initials Name Provider Type    LR Светлана Paredes, PT Physical Therapist        Goals/Plan     Row Name 12/09/20 0955          Bed Mobility Goal 1 (PT)    Activity/Assistive Device (Bed Mobility Goal 1, PT)  sit to supine/supine to sit  -LR     [place] Level/Cues Needed (Bed Mobility Goal 1, PT)  modified independence  -LR     Time Frame (Bed Mobility Goal 1, PT)  long term goal (LTG);1 day  -LR     Progress/Outcomes (Bed Mobility Goal 1, PT)  goal not met;discharged from facility  -     Row Name 12/09/20 0955          Transfer Goal 1 (PT)    Activity/Assistive Device (Transfer Goal 1, PT)  sit-to-stand/stand-to-sit  -LR     [place] Level/Cues Needed (Transfer Goal 1, PT)  contact guard assist  -LR     Time Frame (Transfer Goal 1, PT)  long term goal (LTG);1 day  -LR     Progress/Outcome (Transfer Goal 1, PT)  goal met  -     Row Name 12/09/20 0955          Gait Training Goal 1 (PT)    Activity/Assistive Device (Gait Training Goal 1, PT)  gait (walking locomotion);cane, straight  -LR     [place] Level (Gait Training Goal 1, PT)  contact guard assist  -LR     Distance (Gait Training Goal 1, PT)  150 feet  -LR     Time Frame (Gait Training Goal 1, PT)  long term goal (LTG);1 day  -LR     Progress/Outcome (Gait Training Goal 1, PT)  goal met  -     Row Name 12/09/20 0955          Stairs Goal 1 (PT)    Activity/Assistive Device (Stairs Goal 1, PT)  ascending stairs;descending stairs;using  handrail, right;step-to-step  -LR     [place] Level/Cues Needed (Stairs Goal 1, PT)  contact guard assist  -LR     Number of Stairs (Stairs Goal 1, PT)  3  -LR     Time Frame (Stairs Goal 1, PT)  long term goal (LTG);1 day  -LR     Progress/Outcome (Stairs Goal 1, PT)  goal met  -LR       User Key  (r) = Recorded By, (t) = Taken By, (c) = Cosigned By    Initials Name Provider Type    LR Светлана Paredes, PT Physical Therapist        Clinical Impression     Row Name 12/09/20 0955          Pain    Additional Documentation  Pain Scale: Numbers Pre/Post-Treatment (Group)  -LR     Row Name 12/09/20 0955          Pain Scale: Numbers Pre/Post-Treatment    Pretreatment Pain Rating  0/10 - no pain  -LR     Posttreatment Pain Rating  0/10 - no pain  -LR     Pain Location - Side  Right  -LR     Pain Location  shoulder  -LR     Pain Intervention(s)  Ambulation/increased activity;Repositioned  -LR     Row Name 12/09/20 0955          Plan of Care Review    Plan of Care Reviewed With  patient;spouse  -LR     Progress  improving  -LR     Outcome Summary  Patient ambulated 300 feet with CGA and no LOB/unsteadiness, limited by fatigue and SOA. O2 sats 91% on room air with gait. Patient climbed 10 steps with HHA and one handrail with no difficulty. Patient has been d/c home with family.  -LR     Row Name 12/09/20 0955          Therapy Assessment/Plan (PT)    Patient/Family Therapy Goals Statement (PT)  go home  -LR     Rehab Potential (PT)  good, to achieve stated therapy goals  -LR     Criteria for Skilled Interventions Met (PT)  yes;meets criteria;skilled treatment is necessary  -LR     Row Name 12/09/20 0955          Vital Signs    Pre SpO2 (%)  93  -LR     O2 Delivery Pre Treatment  room air  -LR     Intra SpO2 (%)  91  -LR     O2 Delivery Intra Treatment  room air  -LR     Post SpO2 (%)  93  -LR     O2 Delivery Post Treatment  room air  -LR     Pre Patient Position  Sitting  -LR     Intra Patient Position  Standing   -LR     Post Patient Position  Sitting  -LR     Row Name 12/09/20 0955          Positioning and Restraints    Pre-Treatment Position  sitting in chair/recliner  -LR     Post Treatment Position  chair  -LR     In Chair  notified nsg;reclined;sitting;call light within reach;encouraged to call for assist;with family/caregiver  -LR       User Key  (r) = Recorded By, (t) = Taken By, (c) = Cosigned By    Initials Name Provider Type    Светлана Gerber, PT Physical Therapist        Outcome Measures     Row Name 12/09/20 0955          How much help from another person do you currently need...    Turning from your back to your side while in flat bed without using bedrails?  4  -LR     Moving from lying on back to sitting on the side of a flat bed without bedrails?  3  -LR     Moving to and from a bed to a chair (including a wheelchair)?  3  -LR     Standing up from a chair using your arms (e.g., wheelchair, bedside chair)?  3  -LR     Climbing 3-5 steps with a railing?  3  -LR     To walk in hospital room?  3  -LR     AM-PAC 6 Clicks Score (PT)  19  -LR     Row Name 12/09/20 0955          Functional Assessment    Outcome Measure Options  AM-PAC 6 Clicks Basic Mobility (PT)  -LR       User Key  (r) = Recorded By, (t) = Taken By, (c) = Cosigned By    Initials Name Provider Type    Светлана Gerber, PT Physical Therapist        Physical Therapy Education                 Title: PT OT SLP Therapies (Done)     Topic: Physical Therapy (Done)     Point: Mobility training (Done)     Learning Progress Summary           Patient Acceptance, EZAC, PAM KUHN by LR at 12/9/2020 0955    Comment: Educated on NWB status of R UE, precautions, safety with mobility, correct sit<->stand t/f technique, correct gait mechanics, correct stair training technique, and correct car t/f technique.   Significant Other Acceptance, EZAC VU, DU by  at 12/9/2020 0955    Comment: Educated on NWB status of R UE, precautions, safety with mobility,  correct sit<->stand t/f technique, correct gait mechanics, correct stair training technique, and correct car t/f technique.                   Point: Home exercise program (Done)     Learning Progress Summary           Patient Acceptance, E,D, VU,DU by LR at 12/9/2020 0955    Comment: Educated on NWB status of R UE, precautions, safety with mobility, correct sit<->stand t/f technique, correct gait mechanics, correct stair training technique, and correct car t/f technique.   Significant Other Acceptance, E,D, VU,DU by LR at 12/9/2020 0955    Comment: Educated on NWB status of R UE, precautions, safety with mobility, correct sit<->stand t/f technique, correct gait mechanics, correct stair training technique, and correct car t/f technique.                   Point: Body mechanics (Done)     Learning Progress Summary           Patient Acceptance, E,D, VU,DU by LR at 12/9/2020 0955    Comment: Educated on NWB status of R UE, precautions, safety with mobility, correct sit<->stand t/f technique, correct gait mechanics, correct stair training technique, and correct car t/f technique.   Significant Other Acceptance, E,D, VU,DU by LR at 12/9/2020 0955    Comment: Educated on NWB status of R UE, precautions, safety with mobility, correct sit<->stand t/f technique, correct gait mechanics, correct stair training technique, and correct car t/f technique.                   Point: Precautions (Done)     Learning Progress Summary           Patient Acceptance, E,D, VU,DU by LR at 12/9/2020 0955    Comment: Educated on NWB status of R UE, precautions, safety with mobility, correct sit<->stand t/f technique, correct gait mechanics, correct stair training technique, and correct car t/f technique.   Significant Other Acceptance, E,D, VU,DU by LR at 12/9/2020 0955    Comment: Educated on NWB status of R UE, precautions, safety with mobility, correct sit<->stand t/f technique, correct gait mechanics, correct stair training technique, and  correct car t/f technique.                               User Key     Initials Effective Dates Name Provider Type Discipline    LR 06/19/15 -  Светлана Paredes, PT Physical Therapist PT              PT Recommendation and Plan  Planned Therapy Interventions (PT): balance training, bed mobility training, gait training, home exercise program, patient/family education, stair training, strengthening, transfer training  Plan of Care Reviewed With: patient, spouse  Progress: improving  Outcome Summary: Patient ambulated 300 feet with CGA and no LOB/unsteadiness, limited by fatigue and SOA. O2 sats 91% on room air with gait. Patient climbed 10 steps with HHA and one handrail with no difficulty. Patient has been d/c home with family.     Time Calculation:   PT Charges     Row Name 12/09/20 0955             Time Calculation    Start Time  0955  -LR      PT Received On  12/09/20  -LR      PT Goal Re-Cert Due Date  12/19/20  -LR         Time Calculation- PT    Total Timed Code Minutes- PT  8 minute(s)  -LR         Timed Charges    37212 - Gait Training Minutes   8  -LR        User Key  (r) = Recorded By, (t) = Taken By, (c) = Cosigned By    Initials Name Provider Type    LR Светлана Paredes, PT Physical Therapist        Therapy Charges for Today     Code Description Service Date Service Provider Modifiers Qty    53047621871 HC GAIT TRAINING EA 15 MIN 12/9/2020 Светлана Paredes, PT GP 1    21154532271 HC PT EVAL LOW COMPLEXITY 3 12/9/2020 Светлана Paredes, PT GP 1          PT G-Codes  Outcome Measure Options: AM-PAC 6 Clicks Basic Mobility (PT)  AM-PAC 6 Clicks Score (PT): 19    PT Discharge Summary  Anticipated Discharge Disposition (PT): home with assist  Reason for Discharge: Discharge from facility  Outcomes Achieved: Discharge from facility occurred on same date as evluation  Discharge Destination: Home with assist    Светлана Paredes, NIURKA  12/9/2020

## 2020-12-09 NOTE — PLAN OF CARE
Problem: Adult Inpatient Plan of Care  Goal: Plan of Care Review  Recent Flowsheet Documentation  Taken 12/9/2020 1419 by Rut Charles OT  Progress: improving  Plan of Care Reviewed With:   patient   spouse  Outcome Summary: OT IE completed post chart review. Pt s/p ORIF R proximal humerus fx. Pt with spouse at bedside and involved iin POC, training. Pt with fall hx and frature thus PT consulted for mobility purposes. Pt completed bed mobility with MI with no HOB elevated or bed rails in order to simulate set up at home. OT did educate pt on preferred side to exit bed to adhere to R shoulder precautions of which pt was compliant and demonstrated. With mobility assessment pt should be able to ascend stairs to reach bed vs sleeping on couch. Pt completed sit < > Stand and SPT t/f for fxl purposes with SBA and fxl mobility with CGA with no AD and no LOB or general unsteadiness however educated on decreased speed, controlled, fluid mobility and close monitoring of RUE in sling in doorways and in turning. Pt further educated on optimal placement at seated surfaces to allow for placement of sling and avoid WB. Pt completed UBD d/d shirt w/ mod A, sling w/ CGA (mostly CG), LBD w/ min A for pants, undergarments with need for R lateral mgmt up/down and initial threading, further mod A for socks and shoes with pref for spouse A, toileting hygiene w/ SBA and clothing mgmt w/ CGA and R axilla care with SBA with cues for tech to adhere to R shoulder prec. Collectively OT educated with pt/CG optimal positions for task, seq order, safety awareness, sling mgmt with straps and avoiding showering with nerve cath in place. All parties verbalized, demonstrated understanding. Pt educated on HEP for R elbow/wrist/hand ROM x 10 reps with neutral shoulder position and further ways for spouse to assist pt given decreased motor control at this time. SPouse to assist pt in multiple ways per pt/CG pref. Will progress pt as able while  hospitalized and recomend home w/ A when medically ready.

## 2020-12-09 NOTE — PLAN OF CARE
Goal Outcome Evaluation:  Plan of Care Reviewed With: patient  Progress: improving  Outcome Summary: Pt VSS. A&O x4. No complaints of pain throughout the shift. Pt states her entire arm is very numb, due to arrow pump. Pt ambulating and voiding well. Hoping to be discharged today. Will continue to monitor.

## 2020-12-09 NOTE — PLAN OF CARE
Problem: Adult Inpatient Plan of Care  Goal: Plan of Care Review  Recent Flowsheet Documentation  Taken 12/9/2020 0955 by Светлана Paredes, PT  Progress: improving  Plan of Care Reviewed With:   patient   spouse  Outcome Summary: Patient ambulated 300 feet with CGA and no LOB/unsteadiness, limited by fatigue and SOA. O2 sats 91% on room air with gait. Patient climbed 10 steps with HHA and one handrail with no difficulty. Patient has been d/c home with family.   Goal Outcome Evaluation:  Plan of Care Reviewed With: patient, spouse  Progress: improving  Outcome Summary: Patient ambulated 300 feet with CGA and no LOB/unsteadiness, limited by fatigue and SOA. O2 sats 91% on room air with gait. Patient climbed 10 steps with HHA and one handrail with no difficulty. Patient has been d/c home with family.

## 2020-12-09 NOTE — PROGRESS NOTES
Discharge Planning Assessment  [facility]     Patient Name: Génesis Bañuelos  MRN: [id number]  Today's Date: 12/9/2020    Admit Date: 12/8/2020    Discharge Needs Assessment     Row Name 12/09/20 0821       Living Environment    Lives With  spouse    Name(s) of Who Lives With Patient  Nahum Bañuelos () [phone]    Current Living Arrangements  home/apartment/condo    Primary Care Provided by  self    Provides Primary Care For  no one    Family Caregiver if Needed  spouse    Family Caregiver Names  Nahum Bañuelos () [phone]    Able to Return to Prior Arrangements  yes       Resource/Environmental Concerns    Resource/Environmental Concerns  none    Transportation Concerns  car, none       Transition Planning    Patient/Family Anticipates Transition to  home with family    Patient/Family Anticipated Services at Transition  none    Transportation Anticipated  family or friend will provide       Discharge Needs Assessment    Readmission Within the Last 30 Days  no previous admission in last 30 days    Equipment Currently Used at Home  none    Concerns to be Addressed  denies needs/concerns at this time    Anticipated Changes Related to Illness  none    Equipment Needed After Discharge  none        Discharge Plan     Row Name 12/09/20 0822       Plan    Plan  Home with family    Patient/Family in Agreement with Plan  yes    Plan Comments  Spoke to patient at bedside. Lives with Nahum Bañuelos () [phone] in [place]. Is independent with ADL's. No problems with [facility] Medicare or medications. No DME at home. Plan is home with family. CM will continue to follow.    Final Discharge Disposition Code  01 - home or self-care        Continued Care and Services - Admitted Since 12/8/2020    Coordination has not been started for this encounter.         Demographic Summary     Row Name 12/09/20 0820       General Information    Admission Type  inpatient    Arrived From  PACU/recovery room     Referral Source  admission list    Reason for Consult  discharge planning    Preferred Language  English     Used During This Interaction  no       Contact Information    Permission Granted to Share Info With      Contact Information Obtained for      Contact Information Comments  PCP is Karli Maradiaga        Functional Status     Row Name 12/09/20 0820       Functional Status    Usual Activity Tolerance  good    Current Activity Tolerance  good       Functional Status, IADL    Medications  independent    Meal Preparation  independent    Housekeeping  independent    Laundry  independent    Shopping  independent       Mental Status    General Appearance WDL  WDL       Mental Status Summary    Recent Changes in Mental Status/Cognitive Functioning  no changes       Employment/    Employment Status  retired        Psychosocial    No documentation.       Abuse/Neglect    No documentation.       Legal    No documentation.       Substance Abuse    No documentation.       Patient Forms    No documentation.           Nando Alvarado, RN

## 2020-12-09 NOTE — THERAPY EVALUATION
Patient Name: Génesis Bñauelos  : 1955    MRN: [id number]                              Today's Date: 2020       Admit Date: 2020    Visit Dx:     ICD-10-CM ICD-9-CM   1. S/P ORIF (open reduction internal fixation) fracture ,right proximal humerus fracture  Z98.890 V45.89    Z87.81 V15.51   2. Fall, initial encounter  W19.XXXA E888.9   3. Closed displaced fracture of surgical neck of right humerus, unspecified fracture morphology, initial encounter  S42.211A 812.01     Patient Active Problem List   Diagnosis   • Proximal humeral fracture   • S/P ORIF (open reduction internal fixation) fracture ,right proximal humerus fracture   • Obesity (BMI 30-39.9)   • Essential hypertension   • JUAN ANTONIO on CPAP   • Proximal humerus fracture   • Leukocytosis, mild, likely reactive   • Acute postoperative pain     Past Medical History:   Diagnosis Date   • Depression    • Hypertension    • Hypothyroidism    • Sleep apnea     CPAP AT HOME      Past Surgical History:   Procedure Laterality Date   • CERVICAL CONE BIOPSY     • COLONOSCOPY     • LUMBAR LAMINECTOMY DISCECTOMY DECOMPRESSION     • ORIF HUMERUS FRACTURE Right 2020    Procedure: HUMERUS PROXIMAL OPEN REDUCTION INTERNAL FIXATION RIGHT;  Surgeon: Moris Acosta MD;  Location: [facility];  Service: Orthopedics;  Laterality: Right;   • TONSILLECTOMY       General Information     Row Name 20 1356          OT Time and Intention    Document Type  evaluation  -JY     Mode of Treatment  occupational therapy;individual therapy  -JY     Row Name 20 1356          General Information    Patient Profile Reviewed  yes  -JY     Prior Level of Function  independent:;all household mobility;community mobility;gait;transfer;bed mobility;ADL's;feeding;grooming;dressing;bathing;using stairs  -JY     Existing Precautions/Restrictions  fall;brace worn when out of bed;right;shoulder;non-weight bearing;other (see comments) INDER MERINOB, simple sling, interscalene nerve  catheter  -JY     Barriers to Rehab  none identified  -JY     Row Name 12/09/20 1356          Living Environment    Lives With  spouse;other (see comments) spouse able to assist at all times at d/c, involved in POC, training  -JY     Row Name 12/09/20 1356          Home Main Entrance    Number of Stairs, Main Entrance  three  -JY     Stair Railings, Main Entrance  railing on right side (ascending)  -JY     Row Name 12/09/20 1356          Stairs Within Home, Primary    Stairs, Within Home, Primary  17  -JY     Number of Stairs, Within Home, Primary  other (see comments) 17  -JY     Stair Railings, Within Home, Primary  railing on right side (ascending)  -JY     Row Name 12/09/20 1356          Cognition    Orientation Status (Cognition)  oriented x 4  -JY     Row Name 12/09/20 1356          Safety Issues, Functional Mobility    Safety Issues Affecting Function (Mobility)  safety precaution awareness;safety precautions follow-through/compliance  -JY     Impairments Affecting Function (Mobility)  strength;endurance/activity tolerance;shortness of breath  -JY     Comment, Safety Issues/Impairments (Mobility)  OT educated pt on controlled pace in fxl mobility as observed with brisk pace; further to closely monitor placement of RUE in doorways and in turning to avoid contact, injury with RUE in sling  -JY       User Key  (r) = Recorded By, (t) = Taken By, (c) = Cosigned By    Initials Name Provider Type    Rut Bose OT Occupational Therapist        Mobility/ADL's     Row Name 12/09/20 1359          Bed Mobility    Bed Mobility  supine-sit;scooting/bridging  -JY     Scooting/Bridging [place] (Bed Mobility)  modified independence;verbal cues  -JY     Supine-Sit [place] (Bed Mobility)  modified independence;verbal cues  -JY     Bed Mobility, Safety Issues  decreased use of arms for pushing/pulling  -JY     Comment (Bed Mobility)  OT set bed up like that at home to facilitate mobility without use of bed  rails or HOB elevated; pt able to complete with MI with preferred exit to L side to avoid WB through RUE  -JBALDEMAR     Row Name 12/09/20 1359          Transfers    Transfers  sit-stand transfer;bed-chair transfer;toilet transfer  -JY     Comment (Transfers)  skilled v/c for controlled ascend, descend while adhering to R shoulder precautions, further to reach back prior to seated surface for control and to align self at L side of chair to allow for sling mgmt, placement  -JY     Bed-Chair [place] (Transfers)  standby assist;verbal cues  -JY     Assistive Device (Bed-Chair Transfers)  other (see comments) gait belt  -JY     Sit-Stand [place] (Transfers)  standby assist;verbal cues  -JY     [place] Level (Toilet Transfer)  standby assist;verbal cues  -JY     Assistive Device (Toilet Transfer)  commode;grab bars/safety frame;raised toilet seat;other (see comments) gait belt donned  -BELA     Row Name 12/09/20 1359          Sit-Stand Transfer    Assistive Device (Sit-Stand Transfers)  other (see comments) gait belt donned  -JOANNAY     Row Name 12/09/20 1359          Toilet Transfer    Type (Toilet Transfer)  stand pivot/stand step  -JY     Row Name 12/09/20 1359          Functional Mobility    Functional Mobility- Ind. Level  contact guard assist;verbal cues required  -JY     Functional Mobility- Device  other (see comments) gait belt donned  -JY     Functional Mobility-Distance (Feet)  -- in room distances for ADLs  -JY     Functional Mobility-Maintain WBing  able to maintain weight bearing status  -JY     Functional Mobility- Comment  pt cued to decrease speed in fxl mobility and to closely monitor placement of RUE in sling to avoid contact and injury at RUE; pt presented with no LOB or unsteadiness; refer to PT eval for specifics  -BELA     Row Name 12/09/20 1359          Activities of Daily Living    BADL Assessment/Intervention  bathing;upper body dressing;lower body dressing;toileting  -BELA     Row Name 12/09/20  "1359          Mobility    Extremity Weight-bearing Status  right upper extremity  -JY     Right Upper Extremity (Weight-bearing Status)  (S) non weight-bearing (NWB)  -JY     Row Name 12/09/20 1359          Bathing Assessment/Intervention    [place] Level (Bathing)  upper body;other (see comments);standby assist;verbal cues R axilla care  -JY     Assistive Devices (Bathing)  other (see comments) megan tech  -JY     Position (Bathing)  unsupported sitting  -JY     Comment (Bathing)  OT educated pt/CG on megan tech to wash R axilla care with emphasis on preferred position (seated pendulum), \"flossing\" tech all while adhering to R shoulder precautions; reiterated importance of no showering while nerve cath still donned  -JY     Row Name 12/09/20 1359          Upper Body Dressing Assessment/Training    [place] Level (Upper Body Dressing)  doff;pajama/robe;don;pull-over garment;moderate assist (50% patient effort);verbal cues;other (see comments) CGA for sling mgmt with cues  -JY     Assistive Devices (Upper Body Dressing)  other (see comments) megan tech  -JY     Position (Upper Body Dressing)  unsupported sitting;supported sitting  -JY     Comment (Upper Body Dressing)  Educated pt/CG on preferred position for UBD inclusive of shirt and sling with attention to seq, close monitoring of nerve catheter to prevent dislodging and strap placement, adjustment, wear schedule for sling; reiterated the approach for over the head and button front shirt with consideration for nerve catheter  -JY     Row Name 12/09/20 1359          Lower Body Dressing Assessment/Training    [place] Level (Lower Body Dressing)  don;doff;pants/bottoms;other (see comments);minimum assist (75% patient effort);shoes/slippers;socks;moderate assist (50% patient effort);verbal cues undergarments  -JY     Position (Lower Body Dressing)  supported sitting;supported standing  -JY     Comment (Lower Body Dressing)  educated pt/tech on preferred " "approach to alternate side in up/down mgmt with need for most A in initial threading and R lateral mgmt d/t sling placement; pt preference to receive increased A from spouse in d/d socks, shoes  -     Row Name 12/09/20 1359          Toileting Assessment/Training    [place] Level (Toileting)  perform perineal hygiene;standby assist;adjust/manage clothing;contact guard assist;verbal cues  -     Assistive Devices (Toileting)  commode;grab bar/safety frame;raised toilet seat  -     Position (Toileting)  unsupported sitting;supported standing  -       User Key  (r) = Recorded By, (t) = Taken By, (c) = Cosigned By    Initials Name Provider Type    Rut Bose OT Occupational Therapist        Obj/Interventions     Row Name 12/09/20 1412          Sensory Assessment (Somatosensory)    Sensory Assessment (Somatosensory)  left UE;sensation intact;right UE  -     Left UE Sensory Assessment  general sensation;light touch awareness  -     Right UE Sensory Assessment  general sensation;light touch awareness;other (see comments) pt able to grossly all LT stimuli excpet most proximally of which pt u/a to recognize fully; all RUE light touch pt reports as diminished  -     Row Name 12/09/20 1412          Sensory Interventions    Comment, Sensory Intervention  pt indicates \"numbness\" at proximal RUE, however diminished LT recognition throughout  -     Row Name 12/09/20 1412          Vision Assessment/Intervention    Visual Impairment/Limitations  WFL  -     Row Name 12/09/20 1412          Range of Motion Comprehensive    General Range of Motion  upper extremity range of motion deficits identified  -     Comment, General Range of Motion  LUE AROM WFL, RUE ROM assessed limited to elbow, wrist, hand with decreased motor control and req'd AAROM, PROM for full range  -     Row Name 12/09/20 1412          Strength Comprehensive (MMT)    General Manual Muscle Testing (MMT) Assessment  upper extremity " strength deficits identified  -J     Comment, General Manual Muscle Testing (MMT) Assessment  LUE grossly 4+/5, RUE not tested d/t recent sx  -     Row Name 12/09/20 1412          Elbow/Forearm (Therapeutic Exercise)    Elbow/Forearm (Therapeutic Exercise)  PROM (passive range of motion);AAROM (active assistive range of motion)  -J     Elbow/Forearm AAROM (Therapeutic Exercise)  right;supination;pronation;sitting;10 repetitions  -     Elbow/Forearm PROM (Therapeutic Exercise)  right;flexion;extension;sitting;10 repetitions  -     Row Name 12/09/20 1412          Wrist (Therapeutic Exercise)    Wrist (Therapeutic Exercise)  AAROM (active assistive range of motion)  -J     Wrist AAROM (Therapeutic Exercise)  right;flexion;extension;10 repetitions  -     Row Name 12/09/20 1412          Hand (Therapeutic Exercise)    Hand (Therapeutic Exercise)  AROM (active range of motion)  -     Hand AROM/AAROM (Therapeutic Exercise)  right;AROM (active range of motion);finger flexion;finger extension;10 repetitions  -     Row Name 12/09/20 1412          Balance    Balance Assessment  sitting static balance;sitting dynamic balance;standing static balance;standing dynamic balance  -     Static Sitting Balance  WFL;supported;sitting in chair  -     Dynamic Sitting Balance  WFL;unsupported;sitting in chair;sitting, edge of bed;other (see comments) HEP, dressing, bathing with reach away from AHSAN and toileting tasks  -J     Static Standing Balance  WFL;unsupported;standing  -JY     Dynamic Standing Balance  WFL;supported;standing fxl mobility  -     Balance Interventions  sitting;standing;static;dynamic;sit to stand;supported;occupation based/functional task  -J     Comment, Balance  pt did not present with any LOB or unsteadiness in fxl mobility assessed  however did require cues for decreased speed and controlled, fluid transition, turing to decrease fall risk  -     Row Name 12/09/20 1412          Therapeutic  Exercise    Therapeutic Exercise  elbow/forearm;hand;wrist;other (see comments) OT introduced pt/CG to HEP encompassing R hand/wrist/elbow ROM as indicated by MD; pt able to complete some components w/ AROM, others req'd AAROM and PROM  -JY       User Key  (r) = Recorded By, (t) = Taken By, (c) = Cosigned By    Initials Name Provider Type    Rut Bose OT Occupational Therapist        Goals/Plan     Row Name 12/09/20 1431          Transfer Goal 1 (OT)    Activity/Assistive Device (Transfer Goal 1, OT)  sit-to-stand/stand-to-sit;bed-to-chair/chair-to-bed;toilet;commode  -JY     [place] Level/Cues Needed (Transfer Goal 1, OT)  standby assist;verbal cues required  -JY     Time Frame (Transfer Goal 1, OT)  long term goal (LTG);by discharge  -JY     Progress/Outcome (Transfer Goal 1, OT)  goal ongoing  -JY     Row Name 12/09/20 1431          Bathing Goal 1 (OT)    Activity/Device (Bathing Goal 1, OT)  upper body bathing;other (see comments) R axilla care with preferred tech and position  -JY     [place] Level/Cues Needed (Bathing Goal 1, OT)  supervision required;verbal cues required;tactile cues required  -JY     Time Frame (Bathing Goal 1, OT)  long term goal (LTG);by discharge  -JY     Progress/Outcomes (Bathing Goal 1, OT)  goal ongoing  -JY     Row Name 12/09/20 1431          Dressing Goal 1 (OT)    Activity/Device (Dressing Goal 1, OT)  upper body dressing;other (see comments) Pt/CG d/d sling and UB garments with pref position, tech  -JY     [place]/Cues Needed (Dressing Goal 1, OT)  minimum assist (75% or more patient effort);tactile cues required;verbal cues required  -JY     Time Frame (Dressing Goal 1, OT)  long term goal (LTG);by discharge  -JY     Progress/Outcome (Dressing Goal 1, OT)  goal met  -JY     Row Name 12/09/20 1431          Toileting Goal 1 (OT)    Activity/Device (Toileting Goal 1, OT)  adjust/manage clothing;perform perineal hygiene;commode;grab bar/safety frame;raised  toilet seat  -JY     [place] Level/Cues Needed (Toileting Goal 1, OT)  standby assist;verbal cues required  -JY     Time Frame (Toileting Goal 1, OT)  long term goal (LTG);by discharge  -JY     Progress/Outcome (Toileting Goal 1, OT)  goal ongoing  -JY     Row Name 12/09/20 1431          ROM Goal 1 (OT)    ROM Goal 1 (OT)  Pt to complete seated HEP encompassing RUE elbow, wrist, hand ROM x 10 reps in order to promote tolerable mobility per MD orders  -JY     Time Frame (ROM Goal 1, OT)  long term goal (LTG);by discharge  -JY     Progress/Outcome (ROM Goal 1, OT)  goal ongoing  -JY       User Key  (r) = Recorded By, (t) = Taken By, (c) = Cosigned By    Initials Name Provider Type    Rut Bose OT Occupational Therapist        Clinical Impression     Row Name 12/09/20 1419          Pain Assessment    Additional Documentation  Pain Scale: Numbers Pre/Post-Treatment (Group)  -     Row Name 12/09/20 1419          Pain Scale: Numbers Pre/Post-Treatment    Pretreatment Pain Rating  0/10 - no pain  -JY     Posttreatment Pain Rating  0/10 - no pain  -JY     Pre/Posttreatment Pain Comment  pt denied any pain, tolerated all OT interventions  -JY     Pain Intervention(s)  Cold applied;Repositioned;Ambulation/increased activity;Cold pack  -JY     Row Name 12/09/20 1417          Plan of Care Review    Plan of Care Reviewed With  patient;spouse  -JY     Progress  improving  -JY     Outcome Summary  OT IE completed post chart review. Pt s/p ORIF R proximal humerus fx. Pt with spouse at bedside and involved iin POC, training. Pt with fall hx and frature thus PT consulted for mobility purposes. Pt completed bed mobility with MI with no HOB elevated or bed rails in order to simulate set up at home. OT did educate pt on preferred side to exit bed to adhere to R shoulder precautions of which pt was compliant and demonstrated. With mobility assessment pt should be able to ascend stairs to reach bed vs sleeping on couch. Pt  completed sit < > Stand and SPT t/f for fxl purposes with SBA and fxl mobility with CGA with no AD and no LOB or general unsteadiness however educated on decreased speed, controlled, fluid mobility and close monitoring of RUE in sling in doorways and in turning. Pt further educated on optimal placement at seated surfaces to allow for placement of sling and avoid WB. Pt completed UBD d/d shirt w/ mod A, sling w/ CGA (mostly CG), LBD w/ min A for pants, undergarments with need for R lateral mgmt up/down and initial threading, further mod A for socks and shoes with pref for spouse A, toileting hygiene w/ SBA and clothing mgmt w/ CGA and R axilla care with SBA with cues for tech to adhere to R shoulder prec. Collectively OT educated with pt/CG optimal positions for task, seq order, safety awareness, sling mgmt with straps and avoiding showering with nerve cath in place. All parties verbalized, demonstrated understanding. Pt educated on HEP for R elbow/wrist/hand ROM x 10 reps with neutral shoulder position and further ways for spouse to assist pt given decreased motor control at this time. SPouse to assist pt in multiple ways per pt/CG pref. Will progress pt as able while hospitalized and recomend home w/ A when medically ready.  -JY     Row Name 12/09/20 1419          Therapy Assessment/Plan (OT)    Patient/Family Therapy Goal Statement (OT)  to increase I in ADLs, related t/f, return to PLOF  -JY     Rehab Potential (OT)  good, to achieve stated therapy goals  -JY     Criteria for Skilled Therapeutic Interventions Met (OT)  skilled treatment is necessary  -JY     Therapy Frequency (OT)  daily  -JY     Row Name 12/09/20 1419          Therapy Plan Review/Discharge Plan (OT)    Anticipated Discharge Disposition (OT)  home with assist  -JY     Row Name 12/09/20 1419          Vital Signs    Pre Systolic BP Rehab  111  -JY     Pre Treatment Diastolic BP  66  -JY     Pretreatment Heart Rate (beats/min)  90  -JY      Posttreatment Heart Rate (beats/min)  91  -JY     Pre SpO2 (%)  96  -JY     O2 Delivery Pre Treatment  supplemental O2  -JY     Intra SpO2 (%)  91  -JY     O2 Delivery Intra Treatment  room air  -JY     Post SpO2 (%)  94  -JY     O2 Delivery Post Treatment  room air  -JY     Pre Patient Position  Supine  -JY     Intra Patient Position  Standing  -JY     Post Patient Position  Sitting  -JY     Row Name 12/09/20 1419          Positioning and Restraints    Pre-Treatment Position  in bed  -JY     Post Treatment Position  chair  -JY     In Chair  notified nsg;reclined;call light within reach;encouraged to call for assist;exit alarm on;with family/caregiver;with brace;legs elevated simple sling donned, interscalene intact, pt def SCDs thus educated on ankle pumps, ice pack donned  -JY       User Key  (r) = Recorded By, (t) = Taken By, (c) = Cosigned By    Initials Name Provider Type    Rut Bose OT Occupational Therapist        Outcome Measures     Row Name 12/09/20 1434          How much help from another is currently needed...    Putting on and taking off regular lower body clothing?  2  -JY     Bathing (including washing, rinsing, and drying)  3  -JY     Toileting (which includes using toilet bed pan or urinal)  3  -JY     Putting on and taking off regular upper body clothing  3  -JY     Taking care of personal grooming (such as brushing teeth)  4  -JY     Eating meals  3  -JY     AM-PAC 6 Clicks Score (OT)  18  -JY     Row Name 12/09/20 1434          Functional Assessment    Outcome Measure Options  AM-PAC 6 Clicks Daily Activity (OT)  -JY       User Key  (r) = Recorded By, (t) = Taken By, (c) = Cosigned By    Initials Name Provider Type    Rut Bose OT Occupational Therapist        Occupational Therapy Education                 Title: PT OT SLP Therapies (Done)     Topic: Occupational Therapy (Done)     Point: ADL training (Done)     Description:   Instruct learner(s) on proper safety adaptation and  remediation techniques during self care or transfers.   Instruct in proper use of assistive devices.              Learning Progress Summary           Patient Acceptance, E,D, NR,VU,DU by BELA at 12/9/2020 0924   Family Acceptance, E,D, NR,VU,DU by BELA at 12/9/2020 0924                   Point: Home exercise program (Done)     Description:   Instruct learner(s) on appropriate technique for monitoring, assisting and/or progressing therapeutic exercises/activities.              Learning Progress Summary           Patient Acceptance, E,D, NR,VU,DU by BELA at 12/9/2020 0924   Family Acceptance, E,D, NR,VU,DU by BELA at 12/9/2020 0924                   Point: Precautions (Done)     Description:   Instruct learner(s) on prescribed precautions during self-care and functional transfers.              Learning Progress Summary           Patient Acceptance, E,D, NR,VU,DU by BELA at 12/9/2020 0924   Family Acceptance, E,D, NR,VU,DU by BELA at 12/9/2020 0924                   Point: Body mechanics (Done)     Description:   Instruct learner(s) on proper positioning and spine alignment during self-care, functional mobility activities and/or exercises.              Learning Progress Summary           Patient Acceptance, E,D, NR,VU,DU by BELA at 12/9/2020 0924   Family Acceptance, E,D, NR,VU,DU by BELA at 12/9/2020 0924                               User Key     Initials Effective Dates Name Provider Type Discipline     01/29/20 -  Rut Charles OT Occupational Therapist OT              OT Recommendation and Plan  Therapy Frequency (OT): daily  Plan of Care Review  Plan of Care Reviewed With: patient, spouse  Progress: improving  Outcome Summary: OT IE completed post chart review. Pt s/p ORIF R proximal humerus fx. Pt with spouse at bedside and involved iin POC, training. Pt with fall hx and frature thus PT consulted for mobility purposes. Pt completed bed mobility with MI with no HOB elevated or bed rails in order to simulate set up at home.  OT did educate pt on preferred side to exit bed to adhere to R shoulder precautions of which pt was compliant and demonstrated. With mobility assessment pt should be able to ascend stairs to reach bed vs sleeping on couch. Pt completed sit < > Stand and SPT t/f for fxl purposes with SBA and fxl mobility with CGA with no AD and no LOB or general unsteadiness however educated on decreased speed, controlled, fluid mobility and close monitoring of RUE in sling in doorways and in turning. Pt further educated on optimal placement at seated surfaces to allow for placement of sling and avoid WB. Pt completed UBD d/d shirt w/ mod A, sling w/ CGA (mostly CG), LBD w/ min A for pants, undergarments with need for R lateral mgmt up/down and initial threading, further mod A for socks and shoes with pref for spouse A, toileting hygiene w/ SBA and clothing mgmt w/ CGA and R axilla care with SBA with cues for tech to adhere to R shoulder prec. Collectively OT educated with pt/CG optimal positions for task, seq order, safety awareness, sling mgmt with straps and avoiding showering with nerve cath in place. All parties verbalized, demonstrated understanding. Pt educated on HEP for R elbow/wrist/hand ROM x 10 reps with neutral shoulder position and further ways for spouse to assist pt given decreased motor control at this time. SPouse to assist pt in multiple ways per pt/CG pref. Will progress pt as able while hospitalized and recomend home w/ A when medically ready.     Time Calculation:   Time Calculation- OT     Row Name 12/09/20 1437 12/09/20 0955          Time Calculation- OT    OT Start Time  0924 -JY  --     OT Received On  12/09/20 -JY  --     OT Goal Re-Cert Due Date  12/19/20 -JY  --        Timed Charges    18796 - OT Therapeutic Exercise Minutes  16  -JY  --     51906 - Gait Training Minutes   --  8  -LR     84852 - OT Therapeutic Activity Minutes  18 -JY  --     11604 - OT Self Care/Mgmt Minutes  19 -JY  --       User  Key  (r) = Recorded By, (t) = Taken By, (c) = Cosigned By    Initials Name Provider Type    Светлана Gerber, PT Physical Therapist    Rut Bose, MARGOT Occupational Therapist        Therapy Charges for Today     Code Description Service Date Service Provider Modifiers Qty    41016019088 HC OT THER PROC EA 15 MIN 12/9/2020 Rut Charles OT GO 1    66041110537 HC OT THERAPEUTIC ACT EA 15 MIN 12/9/2020 Rut Charles OT GO 1    31767325495 HC OT SELF CARE/MGMT/TRAIN EA 15 MIN 12/9/2020 Rut Charles OT GO 2    04407624960 HC OT EVAL MOD COMPLEXITY 4 12/9/2020 Rut Charles OT GO 1               Rut Charles OT  12/9/2020

## 2020-12-10 NOTE — PROGRESS NOTES
SOFIA Tony    Nerve Cath Post Op Call    Patient Name: Génesis Bañuelos  :  1955  MRN:  [id number]  Date of Discharge: 2020    Nerve Cath Post Op Call:    Analgesia:Good  Pain Score:10  Side Effects:None  Catheter Site:clean  Patient Controlled ON Q pump infusion rate: 6ml/hr  Catheter Plan:Will continue with plan at home without changes and The patient was instructed to call ON CALL Anesthesia provider for any questions or problems  Patient/Family instructed to call ON CALL anesthesia provider for any questions or problems.  Patient Follow Up:

## 2020-12-11 NOTE — PROGRESS NOTES
SOFIA Tony    Nerve Cath Post Op Call    Patient Name: Génesis Bañuelos  :  1955  MRN:  [id number]  Date of Discharge: 2020    Nerve Cath Post Op Call:    Catheter Plan:Patient called, No answer. Message left to call [facility] pain service for any questions or complaints  Patient/Family instructed to call ON CALL anesthesia provider for any questions or problems.  Patient Follow Up:

## 2020-12-14 ENCOUNTER — NURSE TRIAGE (OUTPATIENT)
Dept: CALL CENTER | Facility: HOSPITAL | Age: 65
End: 2020-12-14

## 2020-12-14 NOTE — TELEPHONE ENCOUNTER
"Reviewed AVS, states not to shower until speak with provider. Advised caller to check with Orthopedic surgeon's office. Caller agrees to follow care advice.     Reason for Disposition  • [1] Caller requesting NON-URGENT health information AND [2] PCP's office is the best resource    Additional Information  • Negative: [1] Caller is not with the adult (patient) AND [2] reporting urgent symptoms  • Negative: Lab result questions  • Negative: Medication questions  • Negative: Caller can't be reached by phone  • Negative: Caller has already spoken to PCP or another triager  • Negative: RN needs further essential information from caller in order to complete triage  • Negative: Requesting regular office appointment    Answer Assessment - Initial Assessment Questions  1. REASON FOR CALL or QUESTION: \"What is your reason for calling today?\" or \"How can I best help you?\" or \"What question do you have that I can help answer?\"      Asking if she can wrap her dressing to take a shower    Protocols used: INFORMATION ONLY CALL - NO TRIAGE-ADULT-      "

## 2021-04-30 PROCEDURE — 87077 CULTURE AEROBIC IDENTIFY: CPT | Performed by: NURSE PRACTITIONER

## 2021-04-30 PROCEDURE — 87086 URINE CULTURE/COLONY COUNT: CPT | Performed by: NURSE PRACTITIONER

## 2021-04-30 PROCEDURE — 87186 SC STD MICRODIL/AGAR DIL: CPT | Performed by: NURSE PRACTITIONER

## 2021-05-02 ENCOUNTER — TELEPHONE (OUTPATIENT)
Dept: URGENT CARE | Facility: CLINIC | Age: 66
End: 2021-05-02

## 2021-09-03 PROCEDURE — U0004 COV-19 TEST NON-CDC HGH THRU: HCPCS | Performed by: PHYSICIAN ASSISTANT

## 2022-09-05 ENCOUNTER — APPOINTMENT (OUTPATIENT)
Dept: CT IMAGING | Facility: HOSPITAL | Age: 67
End: 2022-09-05

## 2022-09-05 ENCOUNTER — HOSPITAL ENCOUNTER (OUTPATIENT)
Facility: HOSPITAL | Age: 67
LOS: 1 days | Discharge: HOME | End: 2022-09-07
Attending: EMERGENCY MEDICINE | Admitting: OTOLARYNGOLOGY

## 2022-09-05 DIAGNOSIS — N20.1 OBSTRUCTION OF LEFT URETEROPELVIC JUNCTION (UPJ) DUE TO STONE: ICD-10-CM

## 2022-09-05 DIAGNOSIS — R10.9 ACUTE LEFT FLANK PAIN: Primary | ICD-10-CM

## 2022-09-05 DIAGNOSIS — N13.30 HYDRONEPHROSIS OF LEFT KIDNEY: ICD-10-CM

## 2022-09-05 LAB
ALBUMIN SERPL-MCNC: 4.3 G/DL (ref 3.5–5.2)
ALBUMIN/GLOB SERPL: 1.2 G/DL
ALP SERPL-CCNC: 112 U/L (ref 39–117)
ALT SERPL W P-5'-P-CCNC: 7 U/L (ref 1–33)
ANION GAP SERPL CALCULATED.3IONS-SCNC: 12 MMOL/L (ref 5–15)
AST SERPL-CCNC: 16 U/L (ref 1–32)
BACTERIA UR QL AUTO: ABNORMAL /HPF
BASOPHILS # BLD AUTO: 0.07 10*3/MM3 (ref 0–0.2)
BASOPHILS NFR BLD AUTO: 0.4 % (ref 0–1.5)
BILIRUB SERPL-MCNC: 0.3 MG/DL (ref 0–1.2)
BILIRUB UR QL STRIP: NEGATIVE
BUN SERPL-MCNC: 19 MG/DL (ref 8–23)
BUN/CREAT SERPL: 21.3 (ref 7–25)
CALCIUM SPEC-SCNC: 9.6 MG/DL (ref 8.6–10.5)
CHLORIDE SERPL-SCNC: 105 MMOL/L (ref 98–107)
CLARITY UR: ABNORMAL
CO2 SERPL-SCNC: 22 MMOL/L (ref 22–29)
COLOR UR: ABNORMAL
CREAT SERPL-MCNC: 0.89 MG/DL (ref 0.57–1)
D-LACTATE SERPL-SCNC: 1.3 MMOL/L (ref 0.5–2)
D-LACTATE SERPL-SCNC: 2.6 MMOL/L (ref 0.5–2)
DEPRECATED RDW RBC AUTO: 42.1 FL (ref 37–54)
EGFRCR SERPLBLD CKD-EPI 2021: 70.3 ML/MIN/1.73
EOSINOPHIL # BLD AUTO: 0.2 10*3/MM3 (ref 0–0.4)
EOSINOPHIL NFR BLD AUTO: 1.2 % (ref 0.3–6.2)
ERYTHROCYTE [DISTWIDTH] IN BLOOD BY AUTOMATED COUNT: 13.3 % (ref 12.3–15.4)
GLOBULIN UR ELPH-MCNC: 3.6 GM/DL
GLUCOSE SERPL-MCNC: 142 MG/DL (ref 65–99)
GLUCOSE UR STRIP-MCNC: NEGATIVE MG/DL
HCT VFR BLD AUTO: 42 % (ref 34–46.6)
HGB BLD-MCNC: 14 G/DL (ref 12–15.9)
HGB UR QL STRIP.AUTO: ABNORMAL
HOLD SPECIMEN: NORMAL
HYALINE CASTS UR QL AUTO: ABNORMAL /LPF
IMM GRANULOCYTES # BLD AUTO: 0.05 10*3/MM3 (ref 0–0.05)
IMM GRANULOCYTES NFR BLD AUTO: 0.3 % (ref 0–0.5)
KETONES UR QL STRIP: NEGATIVE
LEUKOCYTE ESTERASE UR QL STRIP.AUTO: ABNORMAL
LIPASE SERPL-CCNC: 31 U/L (ref 13–60)
LYMPHOCYTES # BLD AUTO: 3.67 10*3/MM3 (ref 0.7–3.1)
LYMPHOCYTES NFR BLD AUTO: 21.4 % (ref 19.6–45.3)
MCH RBC QN AUTO: 28.8 PG (ref 26.6–33)
MCHC RBC AUTO-ENTMCNC: 33.3 G/DL (ref 31.5–35.7)
MCV RBC AUTO: 86.4 FL (ref 79–97)
MONOCYTES # BLD AUTO: 1.34 10*3/MM3 (ref 0.1–0.9)
MONOCYTES NFR BLD AUTO: 7.8 % (ref 5–12)
NEUTROPHILS NFR BLD AUTO: 11.81 10*3/MM3 (ref 1.7–7)
NEUTROPHILS NFR BLD AUTO: 68.9 % (ref 42.7–76)
NITRITE UR QL STRIP: NEGATIVE
NRBC BLD AUTO-RTO: 0 /100 WBC (ref 0–0.2)
PH UR STRIP.AUTO: 6.5 [PH] (ref 5–8)
PLATELET # BLD AUTO: 315 10*3/MM3 (ref 140–450)
PMV BLD AUTO: 9.4 FL (ref 6–12)
POTASSIUM SERPL-SCNC: 4.1 MMOL/L (ref 3.5–5.2)
PROCALCITONIN SERPL-MCNC: <0.02 NG/ML (ref 0–0.25)
PROT SERPL-MCNC: 7.9 G/DL (ref 6–8.5)
PROT UR QL STRIP: ABNORMAL
RBC # BLD AUTO: 4.86 10*6/MM3 (ref 3.77–5.28)
RBC # UR STRIP: ABNORMAL /HPF
REF LAB TEST METHOD: ABNORMAL
SODIUM SERPL-SCNC: 139 MMOL/L (ref 136–145)
SP GR UR STRIP: 1.02 (ref 1–1.03)
SQUAMOUS #/AREA URNS HPF: ABNORMAL /HPF
UROBILINOGEN UR QL STRIP: ABNORMAL
WBC # UR STRIP: ABNORMAL /HPF
WBC NRBC COR # BLD: 17.14 10*3/MM3 (ref 3.4–10.8)
WHOLE BLOOD HOLD COAG: NORMAL
WHOLE BLOOD HOLD SPECIMEN: NORMAL

## 2022-09-05 PROCEDURE — 87086 URINE CULTURE/COLONY COUNT: CPT | Performed by: PHYSICIAN ASSISTANT

## 2022-09-05 PROCEDURE — 87040 BLOOD CULTURE FOR BACTERIA: CPT | Performed by: FAMILY MEDICINE

## 2022-09-05 PROCEDURE — G0378 HOSPITAL OBSERVATION PER HR: HCPCS

## 2022-09-05 PROCEDURE — 74177 CT ABD & PELVIS W/CONTRAST: CPT

## 2022-09-05 PROCEDURE — 84145 PROCALCITONIN (PCT): CPT | Performed by: FAMILY MEDICINE

## 2022-09-05 PROCEDURE — 81001 URINALYSIS AUTO W/SCOPE: CPT | Performed by: EMERGENCY MEDICINE

## 2022-09-05 PROCEDURE — 25010000002 IOPAMIDOL 61 % SOLUTION: Performed by: EMERGENCY MEDICINE

## 2022-09-05 PROCEDURE — 80053 COMPREHEN METABOLIC PANEL: CPT | Performed by: EMERGENCY MEDICINE

## 2022-09-05 PROCEDURE — 83605 ASSAY OF LACTIC ACID: CPT | Performed by: EMERGENCY MEDICINE

## 2022-09-05 PROCEDURE — 25010000002 MORPHINE PER 10 MG: Performed by: EMERGENCY MEDICINE

## 2022-09-05 PROCEDURE — 83735 ASSAY OF MAGNESIUM: CPT | Performed by: NURSE PRACTITIONER

## 2022-09-05 PROCEDURE — 36415 COLL VENOUS BLD VENIPUNCTURE: CPT

## 2022-09-05 PROCEDURE — 83036 HEMOGLOBIN GLYCOSYLATED A1C: CPT | Performed by: NURSE PRACTITIONER

## 2022-09-05 PROCEDURE — 25010000002 CEFTRIAXONE PER 250 MG: Performed by: PHYSICIAN ASSISTANT

## 2022-09-05 PROCEDURE — 96375 TX/PRO/DX INJ NEW DRUG ADDON: CPT

## 2022-09-05 PROCEDURE — 99284 EMERGENCY DEPT VISIT MOD MDM: CPT

## 2022-09-05 PROCEDURE — 85025 COMPLETE CBC W/AUTO DIFF WBC: CPT

## 2022-09-05 PROCEDURE — 99204 OFFICE O/P NEW MOD 45 MIN: CPT | Performed by: FAMILY MEDICINE

## 2022-09-05 PROCEDURE — 25010000002 ONDANSETRON PER 1 MG: Performed by: PHYSICIAN ASSISTANT

## 2022-09-05 PROCEDURE — 83690 ASSAY OF LIPASE: CPT | Performed by: EMERGENCY MEDICINE

## 2022-09-05 PROCEDURE — 96365 THER/PROPH/DIAG IV INF INIT: CPT

## 2022-09-05 RX ORDER — BISACODYL 10 MG
10 SUPPOSITORY, RECTAL RECTAL DAILY PRN
Status: DISCONTINUED | OUTPATIENT
Start: 2022-09-05 | End: 2022-09-07

## 2022-09-05 RX ORDER — AMLODIPINE BESYLATE 2.5 MG/1
2.5 TABLET ORAL DAILY
Status: DISCONTINUED | OUTPATIENT
Start: 2022-09-06 | End: 2022-09-07 | Stop reason: HOSPADM

## 2022-09-05 RX ORDER — TAMSULOSIN HYDROCHLORIDE 0.4 MG/1
0.4 CAPSULE ORAL DAILY
Status: DISCONTINUED | OUTPATIENT
Start: 2022-09-06 | End: 2022-09-07

## 2022-09-05 RX ORDER — SODIUM CHLORIDE 0.9 % (FLUSH) 0.9 %
10 SYRINGE (ML) INJECTION EVERY 12 HOURS SCHEDULED
Status: DISCONTINUED | OUTPATIENT
Start: 2022-09-06 | End: 2022-09-07

## 2022-09-05 RX ORDER — SODIUM CHLORIDE 9 MG/ML
100 INJECTION, SOLUTION INTRAVENOUS CONTINUOUS
Status: ACTIVE | OUTPATIENT
Start: 2022-09-06 | End: 2022-09-07

## 2022-09-05 RX ORDER — SODIUM CHLORIDE 9 MG/ML
10 INJECTION INTRAVENOUS AS NEEDED
Status: DISCONTINUED | OUTPATIENT
Start: 2022-09-05 | End: 2022-09-07 | Stop reason: HOSPADM

## 2022-09-05 RX ORDER — CHOLECALCIFEROL (VITAMIN D3) 125 MCG
5 CAPSULE ORAL NIGHTLY PRN
Status: DISCONTINUED | OUTPATIENT
Start: 2022-09-05 | End: 2022-09-07

## 2022-09-05 RX ORDER — LEVOTHYROXINE SODIUM 0.07 MG/1
75 TABLET ORAL
Status: DISCONTINUED | OUTPATIENT
Start: 2022-09-06 | End: 2022-09-07

## 2022-09-05 RX ORDER — ACETAMINOPHEN 160 MG/5ML
650 SOLUTION ORAL EVERY 4 HOURS PRN
Status: DISCONTINUED | OUTPATIENT
Start: 2022-09-05 | End: 2022-09-06 | Stop reason: SDUPTHER

## 2022-09-05 RX ORDER — ONDANSETRON 4 MG/1
4 TABLET, FILM COATED ORAL EVERY 6 HOURS PRN
Status: DISCONTINUED | OUTPATIENT
Start: 2022-09-05 | End: 2022-09-07

## 2022-09-05 RX ORDER — SODIUM CHLORIDE 0.9 % (FLUSH) 0.9 %
10 SYRINGE (ML) INJECTION AS NEEDED
Status: DISCONTINUED | OUTPATIENT
Start: 2022-09-05 | End: 2022-09-07

## 2022-09-05 RX ORDER — NALOXONE HCL 0.4 MG/ML
0.4 VIAL (ML) INJECTION
Status: DISCONTINUED | OUTPATIENT
Start: 2022-09-05 | End: 2022-09-07

## 2022-09-05 RX ORDER — BISACODYL 5 MG/1
5 TABLET, DELAYED RELEASE ORAL DAILY PRN
Status: DISCONTINUED | OUTPATIENT
Start: 2022-09-05 | End: 2022-09-07

## 2022-09-05 RX ORDER — ACETAMINOPHEN 325 MG/1
650 TABLET ORAL EVERY 4 HOURS PRN
Status: DISCONTINUED | OUTPATIENT
Start: 2022-09-05 | End: 2022-09-06 | Stop reason: SDUPTHER

## 2022-09-05 RX ORDER — ONDANSETRON 2 MG/ML
4 INJECTION INTRAMUSCULAR; INTRAVENOUS ONCE
Status: COMPLETED | OUTPATIENT
Start: 2022-09-05 | End: 2022-09-05

## 2022-09-05 RX ORDER — FAMOTIDINE 20 MG/1
40 TABLET, FILM COATED ORAL DAILY
Status: DISCONTINUED | OUTPATIENT
Start: 2022-09-06 | End: 2022-09-07 | Stop reason: HOSPADM

## 2022-09-05 RX ORDER — ONDANSETRON 2 MG/ML
4 INJECTION INTRAMUSCULAR; INTRAVENOUS EVERY 6 HOURS PRN
Status: DISCONTINUED | OUTPATIENT
Start: 2022-09-05 | End: 2022-09-06 | Stop reason: SDUPTHER

## 2022-09-05 RX ORDER — VENLAFAXINE HYDROCHLORIDE 75 MG/1
150 CAPSULE, EXTENDED RELEASE ORAL DAILY
Status: DISCONTINUED | OUTPATIENT
Start: 2022-09-06 | End: 2022-09-07

## 2022-09-05 RX ORDER — AMOXICILLIN 250 MG
2 CAPSULE ORAL 2 TIMES DAILY
Status: DISCONTINUED | OUTPATIENT
Start: 2022-09-06 | End: 2022-09-07

## 2022-09-05 RX ORDER — POLYETHYLENE GLYCOL 3350 17 G/17G
17 POWDER, FOR SOLUTION ORAL DAILY PRN
Status: DISCONTINUED | OUTPATIENT
Start: 2022-09-05 | End: 2022-09-07

## 2022-09-05 RX ORDER — ACETAMINOPHEN 650 MG/1
650 SUPPOSITORY RECTAL EVERY 4 HOURS PRN
Status: DISCONTINUED | OUTPATIENT
Start: 2022-09-05 | End: 2022-09-06 | Stop reason: SDUPTHER

## 2022-09-05 RX ORDER — MORPHINE SULFATE 2 MG/ML
2 INJECTION, SOLUTION INTRAMUSCULAR; INTRAVENOUS ONCE
Status: COMPLETED | OUTPATIENT
Start: 2022-09-05 | End: 2022-09-05

## 2022-09-05 RX ORDER — HYDROMORPHONE HYDROCHLORIDE 1 MG/ML
0.5 INJECTION, SOLUTION INTRAMUSCULAR; INTRAVENOUS; SUBCUTANEOUS
Status: DISCONTINUED | OUTPATIENT
Start: 2022-09-05 | End: 2022-09-07

## 2022-09-05 RX ORDER — HYDROCODONE BITARTRATE AND ACETAMINOPHEN 5; 325 MG/1; MG/1
1 TABLET ORAL EVERY 4 HOURS PRN
Status: DISCONTINUED | OUTPATIENT
Start: 2022-09-05 | End: 2022-09-06 | Stop reason: SDUPTHER

## 2022-09-05 RX ADMIN — IOPAMIDOL 100 ML: 612 INJECTION, SOLUTION INTRAVENOUS at 20:53

## 2022-09-05 RX ADMIN — MORPHINE SULFATE 2 MG: 2 INJECTION, SOLUTION INTRAMUSCULAR; INTRAVENOUS at 19:32

## 2022-09-05 RX ADMIN — SODIUM CHLORIDE 1 G: 900 INJECTION INTRAVENOUS at 22:30

## 2022-09-05 RX ADMIN — ONDANSETRON 4 MG: 2 INJECTION INTRAMUSCULAR; INTRAVENOUS at 19:32

## 2022-09-05 RX ADMIN — SODIUM CHLORIDE 1000 ML: 9 INJECTION, SOLUTION INTRAVENOUS at 19:35

## 2022-09-05 NOTE — ED PROVIDER NOTES
[place]    EMERGENCY DEPARTMENT ENCOUNTER      Pt Name: Génesis Bañuelos  MRN: [id number]  YOB: 1955  Date of evaluation: 9/5/2022  Provider: PRIYA Maravilla    CHIEF COMPLAINT       Chief Complaint   Patient presents with   • Flank Pain         HISTORY OF PRESENT ILLNESS  (Location/Symptom, Timing/Onset, Context/Setting, Quality, Duration, Modifying Factors, Severity.)   éGnesis Bañuelos is a 67 y.o. female who presents to the emergency department from urgent care with complaints of left flank pain radiating into her lower abdomen, dysuria, hematuria, nausea and weakness.  Patient reports that her symptoms started yesterday.  She states that she has not been feeling well most of the day today and presented to the urgent treatment center for further evaluation.  On their assessment they were concerned that patient may have a kidney stone and recommended she present to the ER for further evaluation.  Patient shares that she has been sick to her stomach, experiencing sweating, fatigue and weakness.  She reports nausea but denies vomiting.  Patient has no prior history of kidney stones.  She has no history of any abdominal surgeries in the past.  She reports that any kind of movement seems to make her symptoms worse but that is okay when she is laying still.  She is followed by Dr. Cherri Maradiaga at the [facility] for primary care.  She reports no additional associated symptoms on exam.    HPI   Nursing notes were reviewed.    REVIEW OF SYSTEMS    (2-9 systems for level 4, 10 or more for level 5)   Review of Systems   Constitutional: Positive for activity change, chills and fatigue. Negative for fever.   Respiratory: Negative.    Cardiovascular: Negative.    Gastrointestinal: Positive for abdominal pain, constipation and nausea. Negative for diarrhea and vomiting.   Genitourinary: Positive for dysuria, flank pain and hematuria.   Musculoskeletal: Positive for back pain.        All systems  reviewed and negative except for those discussed in HPI.   PAST MEDICAL HISTORY     Past Medical History:   Diagnosis Date   • Depression    • Hypertension    • Hypothyroidism    • Sleep apnea     CPAP AT HOME          SURGICAL HISTORY       Past Surgical History:   Procedure Laterality Date   • CERVICAL CONE BIOPSY     • COLONOSCOPY     • LUMBAR LAMINECTOMY DISCECTOMY DECOMPRESSION     • ORIF HUMERUS FRACTURE Right 12/8/2020    Procedure: HUMERUS PROXIMAL OPEN REDUCTION INTERNAL FIXATION RIGHT;  Surgeon: Moris Acosta MD;  Location: [facility];  Service: Orthopedics;  Laterality: Right;   • TONSILLECTOMY           CURRENT MEDICATIONS       Current Facility-Administered Medications:   •  acetaminophen (TYLENOL) tablet 650 mg, 650 mg, Oral, Q4H PRN **OR** acetaminophen (TYLENOL) 160 MG/5ML solution 650 mg, 650 mg, Oral, Q4H PRN **OR** acetaminophen (TYLENOL) suppository 650 mg, 650 mg, Rectal, Q4H PRN, Corinne Roldan, DO  •  amLODIPine (NORVASC) tablet 2.5 mg, 2.5 mg, Oral, Daily, Corinne Roldan, DO  •  sennosides-docusate (PERICOLACE) 8.6-50 MG per tablet 2 tablet, 2 tablet, Oral, BID, 2 tablet at 09/06/22 0012 **AND** polyethylene glycol (MIRALAX) packet 17 g, 17 g, Oral, Daily PRN **AND** bisacodyl (DULCOLAX) EC tablet 5 mg, 5 mg, Oral, Daily PRN **AND** bisacodyl (DULCOLAX) suppository 10 mg, 10 mg, Rectal, Daily PRN, Corinne Roldan, DO  •  cefTRIAXone (ROCEPHIN) 1 g/100 mL 0.9% NS (MBP), 1 g, Intravenous, Q24H, Corinne Roldan, DO  •  famotidine (PEPCID) tablet 40 mg, 40 mg, Oral, Daily, Corinne Roldan, DO  •  HYDROcodone-acetaminophen (NORCO) 5-325 MG per tablet 1 tablet, 1 tablet, Oral, Q4H PRN, Corinne Roldan, DO  •  HYDROmorphone (DILAUDID) injection 0.5 mg, 0.5 mg, Intravenous, Q2H PRN **AND** naloxone (NARCAN) injection 0.4 mg, 0.4 mg, Intravenous, Q5 Min PRN, Corinne Roldan, DO  •  levothyroxine (SYNTHROID, LEVOTHROID) tablet 75 mcg, 75 mcg, Oral, Q AM, Corinne Roldan, DO  •  melatonin tablet 5 mg, 5 mg,  Oral, Nightly PRN, YuliRezaa J, DO  •  ondansetron (ZOFRAN) tablet 4 mg, 4 mg, Oral, Q6H PRN **OR** ondansetron (ZOFRAN) injection 4 mg, 4 mg, Intravenous, Q6H PRN, Yuli, Corinne J, DO  •  Sodium Chloride (PF) 0.9 % 10 mL, 10 mL, Intravenous, PRN, YuliRezaa J, DO  •  sodium chloride 0.9 % flush 10 mL, 10 mL, Intravenous, Q12H, YuliCorinne mckeon, DO, 10 mL at 09/06/22 0013  •  sodium chloride 0.9 % flush 10 mL, 10 mL, Intravenous, PRN, Yuli, Corinne J, DO  •  sodium chloride 0.9 % infusion, 100 mL/hr, Intravenous, Continuous, Corinne Roldan, DO, Last Rate: 100 mL/hr at 09/06/22 0013, 100 mL/hr at 09/06/22 0013  •  tamsulosin (FLOMAX) 24 hr capsule 0.4 mg, 0.4 mg, Oral, Daily, YuliCorinne, DO, 0.4 mg at 09/06/22 0012  •  venlafaxine XR (EFFEXOR-XR) 24 hr capsule 150 mg, 150 mg, Oral, Daily, YuliRezaa J, DO    ALLERGIES     Celecoxib, Latex, Sulfa antibiotics, and Penicillins    FAMILY HISTORY     History reviewed. No pertinent family history.       SOCIAL HISTORY       Social History     Socioeconomic History   • Marital status:    Tobacco Use   • Smoking status: Never Smoker   • Smokeless tobacco: Never Used   Vaping Use   • Vaping Use: Never used   Substance and Sexual Activity   • Alcohol use: Not Currently     Comment: occasionally   • Drug use: Defer   • Sexual activity: Defer         PHYSICAL EXAM    (up to 7 for level 4, 8 or more for level 5)   Physical Exam  Vitals and nursing note reviewed.   Constitutional:       General: She is not in acute distress.     Appearance: Normal appearance. She is well-developed. She is ill-appearing. She is not toxic-appearing.   HENT:      Head: Normocephalic and atraumatic.      Nose: Nose normal.      Mouth/Throat:      Mouth: Mucous membranes are dry.   Eyes:      Extraocular Movements: Extraocular movements intact.   Cardiovascular:      Rate and Rhythm: Normal rate and regular rhythm.   Pulmonary:      Effort: Pulmonary effort is normal. No respiratory distress.       Breath sounds: Normal breath sounds.   Abdominal:      General: There is no distension.      Palpations: Abdomen is soft.      Tenderness: There is abdominal tenderness. There is left CVA tenderness. There is no right CVA tenderness.   Musculoskeletal:         General: Normal range of motion.      Cervical back: Normal range of motion.   Skin:     General: Skin is warm and dry.   Neurological:      General: No focal deficit present.      Mental Status: She is alert.   Psychiatric:         Behavior: Behavior normal.         Thought Content: Thought content normal.         Judgment: Judgment normal.          DIAGNOSTIC RESULTS     EKG: All EKGs are interpreted by the Emergency Department Physician who either signs or Co-signs this chart in the absence of a cardiologist.    No orders to display       RADIOLOGY:   Non-plain film images such as CT, Ultrasound and MRI are read by the radiologist. Plain radiographic images are visualized and preliminarily interpreted by the emergency physician with the below findings:      [x] Radiologist's Report Reviewed:  CT Abdomen Pelvis With Contrast   Final Result   Addendum 1 of 1   Please note there is a complex cystic structure within the left    adnexa/ovary measuring 6.3 x 4.8 cm in diameter which could potentially    represent a solid lesion is well given its high density. An ultrasound is    recommended for further evaluation.      Electronically signed by:  Kaleb Salgado D.O.     9/5/2022 8:31 PM      Final         1. 1.1 x 0.7 cm stone in the left ureteropelvic junction with mild left-sided hydronephrosis.   2. Diverticulosis without evidence of diverticulitis.   3. Cholelithiasis.               Electronically signed by:  Kaleb Salgado D.O.     9/5/2022 7:30 PM Mountain Time            ED BEDSIDE ULTRASOUND:   Performed by ED Physician - none    LABS:    I have reviewed and interpreted all of the currently available lab results from this visit (if applicable):  Results  for orders placed or performed during the hospital encounter of 09/05/22   Comprehensive Metabolic Panel    Specimen: Blood   Result Value Ref Range    Glucose 142 (H) 65 - 99 mg/dL    BUN 19 8 - 23 mg/dL    Creatinine 0.89 0.57 - 1.00 mg/dL    Sodium 139 136 - 145 mmol/L    Potassium 4.1 3.5 - 5.2 mmol/L    Chloride 105 98 - 107 mmol/L    CO2 22.0 22.0 - 29.0 mmol/L    Calcium 9.6 8.6 - 10.5 mg/dL    Total Protein 7.9 6.0 - 8.5 g/dL    Albumin 4.30 3.50 - 5.20 g/dL    ALT (SGPT) 7 1 - 33 U/L    AST (SGOT) 16 1 - 32 U/L    Alkaline Phosphatase 112 39 - 117 U/L    Total Bilirubin 0.3 0.0 - 1.2 mg/dL    Globulin 3.6 gm/dL    A/G Ratio 1.2 g/dL    BUN/Creatinine Ratio 21.3 7.0 - 25.0    Anion Gap 12.0 5.0 - 15.0 mmol/L    eGFR 70.3 >60.0 mL/min/1.73   Lipase    Specimen: Blood   Result Value Ref Range    Lipase 31 13 - 60 U/L   Urinalysis With Microscopic If Indicated (No Culture) - Urine, Clean Catch    Specimen: Urine, Clean Catch   Result Value Ref Range    Color, UA Red (A) Yellow, Straw    Appearance, UA Turbid (A) Clear    pH, UA 6.5 5.0 - 8.0    Specific Gravity, UA 1.024 1.001 - 1.030    Glucose, UA Negative Negative    Ketones, UA Negative Negative    Bilirubin, UA Negative Negative    Blood, UA Large (3+) (A) Negative    Protein,  mg/dL (2+) (A) Negative    Leuk Esterase, UA Moderate (2+) (A) Negative    Nitrite, UA Negative Negative    Urobilinogen, UA 1.0 E.U./dL 0.2 - 1.0 E.U./dL   Lactic Acid, Plasma    Specimen: Blood   Result Value Ref Range    Lactate 2.6 (C) 0.5 - 2.0 mmol/L   CBC Auto Differential    Specimen: Blood   Result Value Ref Range    WBC 17.14 (H) 3.40 - 10.80 10*3/mm3    RBC 4.86 3.77 - 5.28 10*6/mm3    Hemoglobin 14.0 12.0 - 15.9 g/dL    Hematocrit 42.0 34.0 - 46.6 %    MCV 86.4 79.0 - 97.0 fL    MCH 28.8 26.6 - 33.0 pg    MCHC 33.3 31.5 - 35.7 g/dL    RDW 13.3 12.3 - 15.4 %    RDW-SD 42.1 37.0 - 54.0 fl    MPV 9.4 6.0 - 12.0 fL    Platelets 315 140 - 450 10*3/mm3    Neutrophil %  68.9 42.7 - 76.0 %    Lymphocyte % 21.4 19.6 - 45.3 %    Monocyte % 7.8 5.0 - 12.0 %    Eosinophil % 1.2 0.3 - 6.2 %    Basophil % 0.4 0.0 - 1.5 %    Immature Grans % 0.3 0.0 - 0.5 %    Neutrophils, Absolute 11.81 (H) 1.70 - 7.00 10*3/mm3    Lymphocytes, Absolute 3.67 (H) 0.70 - 3.10 10*3/mm3    Monocytes, Absolute 1.34 (H) 0.10 - 0.90 10*3/mm3    Eosinophils, Absolute 0.20 0.00 - 0.40 10*3/mm3    Basophils, Absolute 0.07 0.00 - 0.20 10*3/mm3    Immature Grans, Absolute 0.05 0.00 - 0.05 10*3/mm3    nRBC 0.0 0.0 - 0.2 /100 WBC   STAT Lactic Acid, Reflex    Specimen: Blood   Result Value Ref Range    Lactate 1.3 0.5 - 2.0 mmol/L   Urinalysis, Microscopic Only - Urine, Clean Catch    Specimen: Urine, Clean Catch   Result Value Ref Range    RBC, UA Too Numerous to Count (A) None Seen, 0-2 /HPF    WBC, UA Too Numerous to Count (A) None Seen, 0-2 /HPF    Bacteria, UA 1+ (A) None Seen, Trace /HPF    Squamous Epithelial Cells, UA 3-6 (A) None Seen, 0-2 /HPF    Hyaline Casts, UA None Seen 0 - 6 /LPF    Methodology Manual Light Microscopy    Procalcitonin    Specimen: Blood   Result Value Ref Range    Procalcitonin <0.02 0.00 - 0.25 ng/mL   Hemoglobin A1c    Specimen: Blood   Result Value Ref Range    Hemoglobin A1C 5.40 4.80 - 5.60 %   Green Top (Gel)   Result Value Ref Range    Extra Tube Hold for add-ons.    Lavender Top   Result Value Ref Range    Extra Tube hold for add-on    Gold Top - SST   Result Value Ref Range    Extra Tube Hold for add-ons.    Gray Top   Result Value Ref Range    Extra Tube Hold for add-ons.    Light Blue Top   Result Value Ref Range    Extra Tube Hold for add-ons.         All other labs were within normal range or not returned as of this dictation.      EMERGENCY DEPARTMENT COURSE and DIFFERENTIAL DIAGNOSIS/MDM:   Vitals:    Vitals:    09/05/22 1930 09/05/22 2000 09/05/22 2230 09/05/22 2325   BP: 155/91 168/87 140/93 144/86   BP Location:    Right arm   Patient Position:    Lying   Pulse:    98    Resp:    18   Temp:    99.2 °F (37.3 °C)   TempSrc:    Oral   SpO2: 99% 96% 94% 95%   Weight:    90.6 kg (199 lb 12.8 oz)   Height:           ED Course as of 09/06/22 0157   Mon Sep 05, 2022   2138 Urology paged [JG]   Tue Sep 06, 2022   0150 In summary this is a 67 year old female who presents to ED with complaints of left flank pain radiating to her groin. Elevated WBC at 17.14, initial lactate of 2.6. Gross hematuria on UA. Clinical findings suspicious for kidney stone. CT scan demonstrates a 1.1 x 0.7 cm stone within the left ureteropelvic junction with mild left-sided hydronephrosis. Urology consulted with recommendations for outpatient shockwave or admission to hospitalist. Patient and family not comfortable with outpatient follow up based on presentation and symptoms this evening. Hospitalist paged for admission and agreeable to accept patient. Patient and family agreeable to plan of care and hospital admission. Urine and blood cultures obtained, initial dose of abx provided in ED. Patient responded well to symptomatic care in ED. At time of admission disposition, patient resting in a position of comfort, no acute distress and vital signs stable. [JG]      ED Course User Index  [JG] Darinel Camejo PA       MDM  Number of Diagnoses or Management Options  Acute left flank pain: new, needed workup  Hydronephrosis of left kidney: new, needed workup  Obstruction of left ureteropelvic junction (UPJ) due to stone: new, needed workup     Amount and/or Complexity of Data Reviewed  Clinical lab tests: reviewed  Tests in the radiology section of CPT®: reviewed  Discuss the patient with other providers: yes    Risk of Complications, Morbidity, and/or Mortality  Presenting problems: moderate  Diagnostic procedures: moderate  Management options: moderate    Patient Progress  Patient progress: stable         MEDICATIONS ADMINISTERED IN ED:  Medications   Sodium Chloride (PF) 0.9 % 10 mL (has no administration in time  range)   cefTRIAXone (ROCEPHIN) 1 g/100 mL 0.9% NS (MBP) (has no administration in time range)   amLODIPine (NORVASC) tablet 2.5 mg (has no administration in time range)   levothyroxine (SYNTHROID, LEVOTHROID) tablet 75 mcg (has no administration in time range)   venlafaxine XR (EFFEXOR-XR) 24 hr capsule 150 mg (has no administration in time range)   sodium chloride 0.9 % flush 10 mL (10 mL Intravenous Given 9/6/22 0013)   sodium chloride 0.9 % flush 10 mL (has no administration in time range)   sodium chloride 0.9 % infusion (100 mL/hr Intravenous New Bag 9/6/22 0013)   tamsulosin (FLOMAX) 24 hr capsule 0.4 mg (0.4 mg Oral Given 9/6/22 0012)   acetaminophen (TYLENOL) tablet 650 mg (has no administration in time range)     Or   acetaminophen (TYLENOL) 160 MG/5ML solution 650 mg (has no administration in time range)     Or   acetaminophen (TYLENOL) suppository 650 mg (has no administration in time range)   HYDROcodone-acetaminophen (NORCO) 5-325 MG per tablet 1 tablet (has no administration in time range)   HYDROmorphone (DILAUDID) injection 0.5 mg (has no administration in time range)     And   naloxone (NARCAN) injection 0.4 mg (has no administration in time range)   melatonin tablet 5 mg (has no administration in time range)   ondansetron (ZOFRAN) tablet 4 mg (has no administration in time range)     Or   ondansetron (ZOFRAN) injection 4 mg (has no administration in time range)   sennosides-docusate (PERICOLACE) 8.6-50 MG per tablet 2 tablet (2 tablets Oral Given 9/6/22 0012)     And   polyethylene glycol (MIRALAX) packet 17 g (has no administration in time range)     And   bisacodyl (DULCOLAX) EC tablet 5 mg (has no administration in time range)     And   bisacodyl (DULCOLAX) suppository 10 mg (has no administration in time range)   famotidine (PEPCID) tablet 40 mg (has no administration in time range)   ondansetron (ZOFRAN) injection 4 mg (4 mg Intravenous Given 9/5/22 1932)   morphine injection 2 mg (2 mg  Intravenous Given 9/5/22 1932)   sodium chloride 0.9 % bolus 1,000 mL (0 mL Intravenous Stopped 9/5/22 2150)   iopamidol (ISOVUE-300) 61 % injection 100 mL (100 mL Intravenous Given 9/5/22 2053)   cefTRIAXone (ROCEPHIN) 1 g/100 mL 0.9% NS (MBP) (0 g Intravenous Stopped 9/5/22 2300)       PROCEDURES:  Procedures          CRITICAL CARE TIME    Total Critical Care time was 0 minutes, excluding separately reportable procedures.   There was a high probability of clinically significant/life threatening deterioration in the patient's condition which required my urgent intervention.      FINAL IMPRESSION      1. Acute left flank pain    2. Obstruction of left ureteropelvic junction (UPJ) due to stone    3. Hydronephrosis of left kidney          DISPOSITION/PLAN     ED Disposition     ED Disposition   Decision to Admit    Condition   --    Comment   Level of Care: Telemetry [5]   Diagnosis: Acute left flank pain [183471]   Admitting Physician: ODALIS ROMERO [[id number]]   Attending Physician: ODALIS ROMERO [[id number]]   Bed Request Comments: tele                 Comment: Please note this report has been produced using speech recognition software.      PRIYA Maravilla Jason C, PA  09/06/22 0157

## 2022-09-06 ENCOUNTER — APPOINTMENT (OUTPATIENT)
Dept: ULTRASOUND IMAGING | Facility: HOSPITAL | Age: 67
End: 2022-09-06

## 2022-09-06 ENCOUNTER — ANESTHESIA EVENT (OUTPATIENT)
Dept: PERIOP | Facility: HOSPITAL | Age: 67
End: 2022-09-06

## 2022-09-06 ENCOUNTER — ANESTHESIA (OUTPATIENT)
Dept: PERIOP | Facility: HOSPITAL | Age: 67
End: 2022-09-06

## 2022-09-06 ENCOUNTER — APPOINTMENT (OUTPATIENT)
Dept: GENERAL RADIOLOGY | Facility: HOSPITAL | Age: 67
End: 2022-09-06

## 2022-09-06 PROBLEM — N20.1 OBSTRUCTION OF LEFT URETEROPELVIC JUNCTION (UPJ) DUE TO STONE: Status: ACTIVE | Noted: 2022-09-06

## 2022-09-06 PROBLEM — N39.0 ACUTE UTI (URINARY TRACT INFECTION): Status: ACTIVE | Noted: 2022-09-06

## 2022-09-06 PROBLEM — N94.89 ADNEXAL MASS: Status: ACTIVE | Noted: 2022-09-06

## 2022-09-06 LAB
ANION GAP SERPL CALCULATED.3IONS-SCNC: 7 MMOL/L (ref 5–15)
BACTERIA SPEC AEROBE CULT: NORMAL
BASOPHILS # BLD AUTO: 0.05 10*3/MM3 (ref 0–0.2)
BASOPHILS NFR BLD AUTO: 0.4 % (ref 0–1.5)
BUN SERPL-MCNC: 15 MG/DL (ref 8–23)
BUN/CREAT SERPL: 21.1 (ref 7–25)
CALCIUM SPEC-SCNC: 9 MG/DL (ref 8.6–10.5)
CHLORIDE SERPL-SCNC: 108 MMOL/L (ref 98–107)
CO2 SERPL-SCNC: 28 MMOL/L (ref 22–29)
CREAT SERPL-MCNC: 0.71 MG/DL (ref 0.57–1)
DEPRECATED RDW RBC AUTO: 43.9 FL (ref 37–54)
EGFRCR SERPLBLD CKD-EPI 2021: 92.2 ML/MIN/1.73
EOSINOPHIL # BLD AUTO: 0.13 10*3/MM3 (ref 0–0.4)
EOSINOPHIL NFR BLD AUTO: 1.1 % (ref 0.3–6.2)
ERYTHROCYTE [DISTWIDTH] IN BLOOD BY AUTOMATED COUNT: 13.6 % (ref 12.3–15.4)
GLUCOSE SERPL-MCNC: 99 MG/DL (ref 65–99)
HBA1C MFR BLD: 5.4 % (ref 4.8–5.6)
HCT VFR BLD AUTO: 37.7 % (ref 34–46.6)
HGB BLD-MCNC: 12.7 G/DL (ref 12–15.9)
IMM GRANULOCYTES # BLD AUTO: 0.03 10*3/MM3 (ref 0–0.05)
IMM GRANULOCYTES NFR BLD AUTO: 0.3 % (ref 0–0.5)
LYMPHOCYTES # BLD AUTO: 2.92 10*3/MM3 (ref 0.7–3.1)
LYMPHOCYTES NFR BLD AUTO: 24.7 % (ref 19.6–45.3)
MAGNESIUM SERPL-MCNC: 2.2 MG/DL (ref 1.6–2.4)
MCH RBC QN AUTO: 29.6 PG (ref 26.6–33)
MCHC RBC AUTO-ENTMCNC: 33.7 G/DL (ref 31.5–35.7)
MCV RBC AUTO: 87.9 FL (ref 79–97)
MONOCYTES # BLD AUTO: 1.05 10*3/MM3 (ref 0.1–0.9)
MONOCYTES NFR BLD AUTO: 8.9 % (ref 5–12)
NEUTROPHILS NFR BLD AUTO: 64.6 % (ref 42.7–76)
NEUTROPHILS NFR BLD AUTO: 7.62 10*3/MM3 (ref 1.7–7)
NRBC BLD AUTO-RTO: 0 /100 WBC (ref 0–0.2)
PLATELET # BLD AUTO: 265 10*3/MM3 (ref 140–450)
PMV BLD AUTO: 9.5 FL (ref 6–12)
POTASSIUM SERPL-SCNC: 4.1 MMOL/L (ref 3.5–5.2)
RBC # BLD AUTO: 4.29 10*6/MM3 (ref 3.77–5.28)
SARS-COV-2 RDRP RESP QL NAA+PROBE: NORMAL
SODIUM SERPL-SCNC: 143 MMOL/L (ref 136–145)
WBC NRBC COR # BLD: 11.8 10*3/MM3 (ref 3.4–10.8)

## 2022-09-06 PROCEDURE — C2617 STENT, NON-COR, TEM W/O DEL: HCPCS | Performed by: UROLOGY

## 2022-09-06 PROCEDURE — A9270 NON-COVERED ITEM OR SERVICE: HCPCS | Performed by: FAMILY MEDICINE

## 2022-09-06 PROCEDURE — 87635 SARS-COV-2 COVID-19 AMP PRB: CPT | Performed by: FAMILY MEDICINE

## 2022-09-06 PROCEDURE — 25010000002 DEXAMETHASONE PER 1 MG: Performed by: NURSE ANESTHETIST, CERTIFIED REGISTERED

## 2022-09-06 PROCEDURE — 99213 OFFICE O/P EST LOW 20 MIN: CPT | Performed by: HOSPITALIST

## 2022-09-06 PROCEDURE — 25010000002 ONDANSETRON PER 1 MG: Performed by: NURSE ANESTHETIST, CERTIFIED REGISTERED

## 2022-09-06 PROCEDURE — 85025 COMPLETE CBC W/AUTO DIFF WBC: CPT | Performed by: FAMILY MEDICINE

## 2022-09-06 PROCEDURE — 76000 FLUOROSCOPY <1 HR PHYS/QHP: CPT

## 2022-09-06 PROCEDURE — 25010000002 FENTANYL CITRATE (PF) 50 MCG/ML SOLUTION: Performed by: NURSE ANESTHETIST, CERTIFIED REGISTERED

## 2022-09-06 PROCEDURE — 80048 BASIC METABOLIC PNL TOTAL CA: CPT | Performed by: FAMILY MEDICINE

## 2022-09-06 PROCEDURE — 63710000001 SENNOSIDES-DOCUSATE 8.6-50 MG TABLET: Performed by: FAMILY MEDICINE

## 2022-09-06 PROCEDURE — 25010000002 CEFTRIAXONE PER 250 MG: Performed by: FAMILY MEDICINE

## 2022-09-06 PROCEDURE — 63710000001 TAMSULOSIN 0.4 MG CAPSULE: Performed by: FAMILY MEDICINE

## 2022-09-06 PROCEDURE — 25010000002 PROPOFOL 10 MG/ML EMULSION: Performed by: NURSE ANESTHETIST, CERTIFIED REGISTERED

## 2022-09-06 PROCEDURE — 76830 TRANSVAGINAL US NON-OB: CPT

## 2022-09-06 DEVICE — URETERAL STENT
Type: IMPLANTABLE DEVICE | Site: URETER | Status: FUNCTIONAL
Brand: PERCUFLEX™ PLUS

## 2022-09-06 RX ORDER — LIDOCAINE HYDROCHLORIDE 10 MG/ML
INJECTION, SOLUTION EPIDURAL; INFILTRATION; INTRACAUDAL; PERINEURAL AS NEEDED
Status: DISCONTINUED | OUTPATIENT
Start: 2022-09-06 | End: 2022-09-06 | Stop reason: SURG

## 2022-09-06 RX ORDER — PROPOFOL 10 MG/ML
VIAL (ML) INTRAVENOUS AS NEEDED
Status: DISCONTINUED | OUTPATIENT
Start: 2022-09-06 | End: 2022-09-06 | Stop reason: SURG

## 2022-09-06 RX ORDER — HYDROCODONE BITARTRATE AND ACETAMINOPHEN 5; 325 MG/1; MG/1
1 TABLET ORAL EVERY 4 HOURS PRN
Status: DISCONTINUED | OUTPATIENT
Start: 2022-09-06 | End: 2022-09-07 | Stop reason: HOSPADM

## 2022-09-06 RX ORDER — DEXAMETHASONE SODIUM PHOSPHATE 4 MG/ML
INJECTION, SOLUTION INTRA-ARTICULAR; INTRALESIONAL; INTRAMUSCULAR; INTRAVENOUS; SOFT TISSUE AS NEEDED
Status: DISCONTINUED | OUTPATIENT
Start: 2022-09-06 | End: 2022-09-06 | Stop reason: SURG

## 2022-09-06 RX ORDER — ONDANSETRON 2 MG/ML
INJECTION INTRAMUSCULAR; INTRAVENOUS AS NEEDED
Status: DISCONTINUED | OUTPATIENT
Start: 2022-09-06 | End: 2022-09-06 | Stop reason: SURG

## 2022-09-06 RX ORDER — MAGNESIUM HYDROXIDE 1200 MG/15ML
LIQUID ORAL AS NEEDED
Status: DISCONTINUED | OUTPATIENT
Start: 2022-09-06 | End: 2022-09-06 | Stop reason: HOSPADM

## 2022-09-06 RX ORDER — ACETAMINOPHEN 325 MG/1
650 TABLET ORAL EVERY 6 HOURS PRN
Status: DISCONTINUED | OUTPATIENT
Start: 2022-09-06 | End: 2022-09-07 | Stop reason: HOSPADM

## 2022-09-06 RX ORDER — SODIUM CHLORIDE 0.9 % (FLUSH) 0.9 %
10 SYRINGE (ML) INJECTION AS NEEDED
Status: DISCONTINUED | OUTPATIENT
Start: 2022-09-06 | End: 2022-09-06 | Stop reason: HOSPADM

## 2022-09-06 RX ORDER — ONDANSETRON 2 MG/ML
4 INJECTION INTRAMUSCULAR; INTRAVENOUS ONCE AS NEEDED
Status: DISCONTINUED | OUTPATIENT
Start: 2022-09-06 | End: 2022-09-06 | Stop reason: HOSPADM

## 2022-09-06 RX ORDER — BUPIVACAINE HCL/0.9 % NACL/PF 0.125 %
PLASTIC BAG, INJECTION (ML) EPIDURAL AS NEEDED
Status: DISCONTINUED | OUTPATIENT
Start: 2022-09-06 | End: 2022-09-06 | Stop reason: SURG

## 2022-09-06 RX ORDER — ONDANSETRON 2 MG/ML
4 INJECTION INTRAMUSCULAR; INTRAVENOUS EVERY 6 HOURS PRN
Status: DISCONTINUED | OUTPATIENT
Start: 2022-09-06 | End: 2022-09-07 | Stop reason: SDUPTHER

## 2022-09-06 RX ORDER — SODIUM CHLORIDE, SODIUM LACTATE, POTASSIUM CHLORIDE, CALCIUM CHLORIDE 600; 310; 30; 20 MG/100ML; MG/100ML; MG/100ML; MG/100ML
9 INJECTION, SOLUTION INTRAVENOUS CONTINUOUS PRN
Status: DISCONTINUED | OUTPATIENT
Start: 2022-09-06 | End: 2022-09-06 | Stop reason: HOSPADM

## 2022-09-06 RX ORDER — HYDROMORPHONE HYDROCHLORIDE 1 MG/ML
0.5 INJECTION, SOLUTION INTRAMUSCULAR; INTRAVENOUS; SUBCUTANEOUS
Status: DISCONTINUED | OUTPATIENT
Start: 2022-09-06 | End: 2022-09-06 | Stop reason: HOSPADM

## 2022-09-06 RX ORDER — DROPERIDOL 2.5 MG/ML
0.62 INJECTION, SOLUTION INTRAMUSCULAR; INTRAVENOUS ONCE AS NEEDED
Status: DISCONTINUED | OUTPATIENT
Start: 2022-09-06 | End: 2022-09-06 | Stop reason: HOSPADM

## 2022-09-06 RX ORDER — ALBUTEROL SULFATE 2.5 MG/3ML
2.5 SOLUTION RESPIRATORY (INHALATION) ONCE AS NEEDED
Status: DISCONTINUED | OUTPATIENT
Start: 2022-09-06 | End: 2022-09-06 | Stop reason: HOSPADM

## 2022-09-06 RX ORDER — FENTANYL CITRATE 50 UG/ML
INJECTION, SOLUTION INTRAMUSCULAR; INTRAVENOUS AS NEEDED
Status: DISCONTINUED | OUTPATIENT
Start: 2022-09-06 | End: 2022-09-06 | Stop reason: SURG

## 2022-09-06 RX ORDER — EPHEDRINE SULFATE 50 MG/ML
INJECTION, SOLUTION INTRAVENOUS AS NEEDED
Status: DISCONTINUED | OUTPATIENT
Start: 2022-09-06 | End: 2022-09-06 | Stop reason: SURG

## 2022-09-06 RX ORDER — SODIUM CHLORIDE 0.9 % (FLUSH) 0.9 %
10 SYRINGE (ML) INJECTION EVERY 12 HOURS SCHEDULED
Status: DISCONTINUED | OUTPATIENT
Start: 2022-09-06 | End: 2022-09-06 | Stop reason: HOSPADM

## 2022-09-06 RX ORDER — FENTANYL CITRATE 50 UG/ML
50 INJECTION, SOLUTION INTRAMUSCULAR; INTRAVENOUS
Status: DISCONTINUED | OUTPATIENT
Start: 2022-09-06 | End: 2022-09-06 | Stop reason: HOSPADM

## 2022-09-06 RX ADMIN — LIDOCAINE HYDROCHLORIDE 50 MG: 10 INJECTION, SOLUTION EPIDURAL; INFILTRATION; INTRACAUDAL; PERINEURAL at 13:56

## 2022-09-06 RX ADMIN — EPHEDRINE SULFATE 10 MG: 50 INJECTION INTRAVENOUS at 14:09

## 2022-09-06 RX ADMIN — EPHEDRINE SULFATE 10 MG: 50 INJECTION INTRAVENOUS at 14:12

## 2022-09-06 RX ADMIN — ACETAMINOPHEN 650 MG: 325 TABLET, FILM COATED ORAL at 22:13

## 2022-09-06 RX ADMIN — EPHEDRINE SULFATE 10 MG: 50 INJECTION INTRAVENOUS at 14:15

## 2022-09-06 RX ADMIN — PROPOFOL 50 MG: 10 INJECTION, EMULSION INTRAVENOUS at 14:04

## 2022-09-06 RX ADMIN — SODIUM CHLORIDE 1 G: 900 INJECTION INTRAVENOUS at 20:52

## 2022-09-06 RX ADMIN — Medication 10 ML: at 00:13

## 2022-09-06 RX ADMIN — TAMSULOSIN HYDROCHLORIDE 0.4 MG: 0.4 CAPSULE ORAL at 00:12

## 2022-09-06 RX ADMIN — PROPOFOL 200 MG: 10 INJECTION, EMULSION INTRAVENOUS at 13:56

## 2022-09-06 RX ADMIN — Medication 10 ML: at 09:05

## 2022-09-06 RX ADMIN — SODIUM CHLORIDE 100 ML/HR: 9 INJECTION, SOLUTION INTRAVENOUS at 21:38

## 2022-09-06 RX ADMIN — VENLAFAXINE HYDROCHLORIDE 150 MG: 75 CAPSULE, EXTENDED RELEASE ORAL at 09:05

## 2022-09-06 RX ADMIN — FENTANYL CITRATE 100 MCG: 50 INJECTION, SOLUTION INTRAMUSCULAR; INTRAVENOUS at 14:05

## 2022-09-06 RX ADMIN — SODIUM CHLORIDE 100 ML/HR: 9 INJECTION, SOLUTION INTRAVENOUS at 00:13

## 2022-09-06 RX ADMIN — LEVOTHYROXINE SODIUM 75 MCG: 0.07 TABLET ORAL at 06:49

## 2022-09-06 RX ADMIN — AMLODIPINE BESYLATE 2.5 MG: 2.5 TABLET ORAL at 09:05

## 2022-09-06 RX ADMIN — SODIUM CHLORIDE, POTASSIUM CHLORIDE, SODIUM LACTATE AND CALCIUM CHLORIDE 9 ML/HR: 600; 310; 30; 20 INJECTION, SOLUTION INTRAVENOUS at 12:40

## 2022-09-06 RX ADMIN — DEXAMETHASONE SODIUM PHOSPHATE 8 MG: 4 INJECTION, SOLUTION INTRA-ARTICULAR; INTRALESIONAL; INTRAMUSCULAR; INTRAVENOUS; SOFT TISSUE at 14:00

## 2022-09-06 RX ADMIN — FAMOTIDINE 40 MG: 20 TABLET ORAL at 09:05

## 2022-09-06 RX ADMIN — ONDANSETRON 4 MG: 2 INJECTION INTRAMUSCULAR; INTRAVENOUS at 14:04

## 2022-09-06 RX ADMIN — EPHEDRINE SULFATE 10 MG: 50 INJECTION INTRAVENOUS at 14:18

## 2022-09-06 RX ADMIN — SENNOSIDES AND DOCUSATE SODIUM 2 TABLET: 50; 8.6 TABLET ORAL at 00:12

## 2022-09-06 RX ADMIN — Medication 200 MCG: at 14:24

## 2022-09-06 RX ADMIN — PROPOFOL 50 MG: 10 INJECTION, EMULSION INTRAVENOUS at 14:01

## 2022-09-06 NOTE — PLAN OF CARE
Goal Outcome Evaluation:   Pt voided multiple times today, urine strained, small amounts of stones collected. To OR for left stent placement, pt returned to room and has no complaints of pain. VSS, ambulated in room with steady gait. Advanced to regular diet. Transvag U/S not completed today, possibly tomorrow. Plan for potential discharge home tomorrow.

## 2022-09-06 NOTE — H&P
"    [facility] Medicine Services  HISTORY AND PHYSICAL    Patient Name: Génesis Bañuelos  : 1955  MRN: [id number]  Primary Care Physician: Provider, No Known  Date of admission: 2022    Subjective   Subjective     Chief Complaint:  Abdominal pain, hematuria    HPI:  Génesis Bañuelos is a 67 y.o. female with medical history significant for HTN, hypothyroidism, depression, JUAN ANTONIO on CPAP who presents to the ED for evaluation of abdominal pain and hematuria that initially started on . [place] poorly when she woke up on , did not go to [facility], had small amount of blood in urine but felt better as the day went on and no further blood. Today, she was still having some blood in her urine, went to [place] and started having significant amount of pain, perfuse sweating, had to sit down and  took her to [facility] who then referred to the ED. Patient describes abdominal pain, hematuria, nausea, diaphoresis, fatigue and general malaise. Symptoms worse with movement. No fever or chills. No recent sick contacts. Feeling \"pretty good now\" and eating a sandwich box.    ED imaging w/ findings of 1.1x0.7 cm stone in the UPJ w/ mild left hydronephrosis, diverticulosis, cholelithiasis. Lab findings of elevated lactic acid 2.6 w/ f/u 1.3; leukocytosis w/ WBC 17.14 and urinalysis w/ 3+ blood, 2+ leuks, TNTC wbc/rbc, 1+ bacteria - culture pending    Review of Systems   Constitutional: Positive for chills, diaphoresis, fatigue (4-5 days noted more fatigue ) and fever (subjective ).   HENT: Negative for congestion, ear pain, rhinorrhea and sore throat.    Respiratory: Negative for cough, shortness of breath and wheezing.    Cardiovascular: Negative for chest pain, palpitations and leg swelling.   Gastrointestinal: Positive for constipation (chronically, had BM today that was normal) and nausea. Negative for abdominal pain, blood in stool, diarrhea and vomiting.   Genitourinary: Positive for dysuria, " flank pain and hematuria.   Musculoskeletal: Positive for back pain.   Skin: Negative.    Neurological: Negative for dizziness, light-headedness and headaches.   Psychiatric/Behavioral: Negative for dysphoric mood and sleep disturbance. The patient is not nervous/anxious.    All other systems reviewed and are negative.       All other systems reviewed and are negative.     Personal History     Past Medical History:   Diagnosis Date   • Depression    • Hypertension    • Hypothyroidism    • Sleep apnea     CPAP AT HOME              Past Surgical History:   Procedure Laterality Date   • CERVICAL CONE BIOPSY     • COLONOSCOPY     • LUMBAR LAMINECTOMY DISCECTOMY DECOMPRESSION     • ORIF HUMERUS FRACTURE Right 12/8/2020    Procedure: HUMERUS PROXIMAL OPEN REDUCTION INTERNAL FIXATION RIGHT;  Surgeon: Moris Acosta MD;  Location: [facility];  Service: Orthopedics;  Laterality: Right;   • TONSILLECTOMY         Family History: family history includes Alcohol abuse in her father; Breast cancer in her mother; Cancer in her father.     Social History:  reports that she has never smoked. She has never used smokeless tobacco. She reports previous alcohol use. She reports that she does not use drugs.  Social History     Social History Narrative   • Not on file       Medications:  Cholecalciferol, Venlafaxine HCl, amLODIPine, cyanocobalamin, levothyroxine, multivitamin, and venlafaxine XR    Allergies   Allergen Reactions   • Celecoxib Itching, Other (See Comments), Rash and Hives     Celebrex   • Latex Irritability   • Sulfa Antibiotics Hives   • Penicillins Rash and Other (See Comments)     Penicillins       Objective   Objective     Vital Signs:   Temp:  [97.6 °F (36.4 °C)-99.2 °F (37.3 °C)] 99.2 °F (37.3 °C)  Heart Rate:  [77-98] 98  Resp:  [18-24] 18  BP: (128-168)/(73-93) 144/86    Physical Exam  Constitutional:       General: She is not in acute distress.     Appearance: She is well-developed. She is not  toxic-appearing.   HENT:      Head: Normocephalic and atraumatic.      Nose: Nose normal.      Mouth/Throat:      Mouth: Mucous membranes are moist.      Pharynx: Oropharynx is clear.   Eyes:      General: No scleral icterus.     Extraocular Movements: Extraocular movements intact.      Conjunctiva/sclera: Conjunctivae normal.      Pupils: Pupils are equal, round, and reactive to light.   Cardiovascular:      Rate and Rhythm: Normal rate and regular rhythm.      Pulses: Normal pulses.      Heart sounds: Normal heart sounds. No murmur heard.  Pulmonary:      Effort: Pulmonary effort is normal. No respiratory distress.      Breath sounds: Normal breath sounds. No wheezing or rales.   Abdominal:      General: Bowel sounds are normal. There is no distension.      Palpations: Abdomen is soft.      Tenderness: There is abdominal tenderness (LUQ ). There is left CVA tenderness. There is no guarding.   Musculoskeletal:         General: No swelling. Normal range of motion.      Cervical back: Normal range of motion and neck supple.   Skin:     General: Skin is warm and dry.      Capillary Refill: Capillary refill takes less than 2 seconds.      Findings: No rash.   Neurological:      Mental Status: She is alert and oriented to person, place, and time.      Cranial Nerves: No cranial nerve deficit.   Psychiatric:         Mood and Affect: Mood normal.         Behavior: Behavior normal.            Results Reviewed:  I have personally reviewed most recent indicated data and agree with findings including:  [x]  Laboratory  [x]  Radiology  []  EKG/Telemetry  []  Pathology  []  Cardiac/Vascular Studies  []  Old records  []  Other:  Most pertinent findings include: see HPI      LAB RESULTS:      Lab 09/05/22  2147 09/05/22  1847   WBC  --  17.14*   HEMOGLOBIN  --  14.0   HEMATOCRIT  --  42.0   PLATELETS  --  315   NEUTROS ABS  --  11.81*   IMMATURE GRANS (ABS)  --  0.05   LYMPHS ABS  --  3.67*   MONOS ABS  --  1.34*   EOS ABS  --   0.20   MCV  --  86.4   PROCALCITONIN  --  <0.02   LACTATE 1.3 2.6*         Lab 09/05/22  1847   SODIUM 139   POTASSIUM 4.1   CHLORIDE 105   CO2 22.0   ANION GAP 12.0   BUN 19   CREATININE 0.89   EGFR 70.3   GLUCOSE 142*   CALCIUM 9.6         Lab 09/05/22  1847   TOTAL PROTEIN 7.9   ALBUMIN 4.30   GLOBULIN 3.6   ALT (SGPT) 7   AST (SGOT) 16   BILIRUBIN 0.3   ALK PHOS 112   LIPASE 31                     Brief Urine Lab Results  (Last result in the past 365 days)      Color   Clarity   Blood   Leuk Est   Nitrite   Protein   CREAT   Urine HCG        09/05/22 1904 Red   Turbid   Large (3+)   Moderate (2+)   Negative   100 mg/dL (2+)               Microbiology Results (last 10 days)     ** No results found for the last 240 hours. **          CT Abdomen Pelvis With Contrast    Addendum Date: 9/5/2022    Please note there is a complex cystic structure within the left adnexa/ovary measuring 6.3 x 4.8 cm in diameter which could potentially represent a solid lesion is well given its high density. An ultrasound is recommended for further evaluation. Electronically signed by:  Kaleb Salgado D.O.  9/5/2022 8:31 PM    Result Date: 9/5/2022  EXAMINATION: CT ABDOMEN AND PELVIS WITH IV CONTRAST   DATE OF EXAMINATION: 9/5/2022. COMPARISON: None available. INDICATION: Left flank pain. PROCEDURE:  Axial CT of the abdomen and pelvis was performed with contrast and sagittal and coronal reformatted images were performed.  100 mL of Isovue-300 was given intravenously. CT dose lowering techniques were used, to include: automated exposure control, adjustment for patient size, and/or use of iterative reconstruction. FINDINGS: LOWER CHEST :  The visualized lung bases are clear.  There are no pleural or pericardial effusions. ABDOMEN: Liver and Biliary system:  There are a few subcentimeter hypodensities within the liver that are too small to fully characterize. A simple cyst is seen within the left lobe of liver measuring 1.4 cm in diameter.  No suspicious liver lesions are otherwise seen. Adrenal glands:  Normal. Kidneys and ureters: There is a 1.1 x 0.7 cm stone within the left ureteropelvic junction with mild left-sided hydronephrosis. The right kidney and ureter otherwise appear unremarkable Spleen:  Normal. Pancreas:  Normal. Gallbladder:  Numerous calcified gallstones are seen in the dependent portion of the gallbladder. Lymph nodes, Peritoneum and mesentery:  There is no mesenteric or retroperitoneal lymphadenopathy. Gastrointestinal tract:  There are no dilated loops of bowel or free intraperitoneal air.    The pelvis is not clearly seen. There is some scattered colonic diverticuli without evidence of diverticulitis which is moderate within the sigmoid colonic region. Aorta/IVC:   There is mild vascular calcification throughout the abdominal aorta without evidence of aneurysmal dilation or dissection.  IVC normal. Abdominal wall:  Normal. PELVIS: Fluid: There is no free fluid in the pelvis. Lymph Nodes:  There is no pelvic or inguinal lymphadenopathy.. Urinary bladder:  Normal. BONES:  There are no osseous destructive lesions.. ADDITIONAL  SIGNIFICANT FINDINGS:  None.     Impression: 1. 1.1 x 0.7 cm stone in the left ureteropelvic junction with mild left-sided hydronephrosis. 2. Diverticulosis without evidence of diverticulitis. 3. Cholelithiasis. Electronically signed by:  Kaleb Salgado D.O.  9/5/2022 7:30 PM Mountain Time          Assessment & Plan   Assessment & Plan       Acute left flank pain    Acute UTI (urinary tract infection)    Adnexal mass    Obstruction of left ureteropelvic junction (UPJ) due to stone    Left flank pain/Left UPJ stone   - imaging c/w large obstructing stone  - urology consult, Dr. Bolden to see in am  - NPO after MN  - pain control  - IVF, NS @ 100 ml/hr x 24 hours  - flomax  - urine culture pending  - BC x 2  - continue rocephin  - strain urine    Left Adnexal Mass  - complex cystic mass in left adnexa/ovary  measuring 6.3 x 4.8 cm (reviewed with pt)   - we will obtain transvaginal ultrasound in the am.       DVT prophylaxis:  SCDS    CODE STATUS:   Code Status (Patient has no pulse and is not breathing): CPR (Attempt to Resuscitate)  Medical Interventions (Patient has pulse or is breathing): Full Support      This note has been completed as part of a split-shared workflow.     Signature: Electronically signed by HANY Doshi, 09/05/22, 11:58 PM EDT        Attending   Admission Attestation       I have performed an independent face-to-face diagnostic evaluation including performing an independent physical examination as documented here.  The documented plan of care above was reviewed and developed with the advanced practice clinician (APC).      Brief Summary Statement:   Génesis Bañuelos is a 67 y.o. female HTN, JUAN ANTONIO on CPAP, depression and hypothyroidism.   Pt presented to [facility] ED with left LLQ pain and left flank pain and hematuria on Sunday. She continued to feel poorly and [facility] and started having severe pain and diaphoresis. Her  took her to Upon arrival to the ED she was noted to have a 1.1 x 0.7 cm stone in the left UPJ with mild left hydronephrosis, also noted to have cholelithiasis and diverticulosis, along with ;eft adnexa/ovary measuring 6.3 x 4.8 cm. She was noted to have WBC of 17.14 and UA with 3+ blood, 2+LE, TNTC WBC/RBCs and 1 plus bacteria. CT findings were discussed with PT. She will be treated with IV Rocephin, and we will consult urology in the am and obtain a transvaginal ultrasound.     Remainder of detailed HPI is as noted by APC and has been reviewed and/or edited by me for completeness.    Attending Physical Exam:  Temp:  [97.6 °F (36.4 °C)-99.2 °F (37.3 °C)] 99.2 °F (37.3 °C)  Heart Rate:  [77-98] 98  Resp:  [18-24] 18  BP: (128-168)/(73-93) 144/86    Constitutional: Awake, alert  Eyes: PERRLA, sclerae anicteric, no conjunctival injection  HENT: NCAT, mucous  membranes moist  Neck: Supple, no thyromegaly, no lymphadenopathy, trachea midline  Respiratory: Clear to auscultation bilaterally, nonlabored respirations   Cardiovascular: RRR, no murmurs, rubs, or gallops, palpable pedal pulses bilaterally  Gastrointestinal: Positive bowel sounds, soft, nontender, nondistended  Musculoskeletal: No bilateral ankle edema, no clubbing or cyanosis to extremities  Psychiatric: Appropriate affect, cooperative  Neurologic: Oriented x 3, strength symmetric in all extremities, Cranial Nerves grossly intact to confrontation, speech clear  Skin: No rashes      Brief Assessment/Plan :  See detailed assessment and plan developed with APC which I have reviewed and/or edited for completeness.      Corinne Roldan, DO  09/06/22

## 2022-09-06 NOTE — CONSULTS
" Consult    Patient Name: Génesis Bañuelos  Medical Record Number: [id number]  YOB: 1955    Date of consultation: 9/6/2022    Referring Provider: Memo  Reason for Consultation: Ureteral stone    Patient Care Team:  Provider, No Known as PCP - General    Chief complaint   Chief Complaint   Patient presents with   • Flank Pain       Subjective .     History of present illness:    History per hospitalist:    Génesis Bañuelos is a 67 y.o. female with medical history significant for HTN, hypothyroidism, depression, JUAN ANTONIO on CPAP who presents to the ED for evaluation of abdominal pain and hematuria that initially started on Sunday. [place] poorly when she woke up on Sunday, did not go to [place], had small amount of blood in urine but felt better as the day went on and no further blood. Today, she was still having some blood in her urine, went to [place] and started having significant amount of pain, perfuse sweating, had to sit down and  took her to [facility] who then referred to the ED. Patient describes abdominal pain, hematuria, nausea, diaphoresis, fatigue and general malaise. Symptoms worse with movement. No fever or chills. No recent sick contacts. Feeling \"pretty good now\" and eating a sandwich box.     ED imaging w/ findings of 1.1x0.7 cm stone in the UPJ w/ mild left hydronephrosis, diverticulosis, cholelithiasis. Lab findings of elevated lactic acid 2.6 w/ f/u 1.3; leukocytosis w/ WBC 17.14 and urinalysis w/ 3+ blood, 2+ leuks, TNTC wbc/rbc, 1+ bacteria - culture pending    This morning the patient's pain is controlled with IV pain medicine but she continues to still have intermittent discomfort.  She has been having chills and diaphoresis.  She has subjective fevers.  He has never had kidney stones before.    Past Medical History:   Diagnosis Date   • Depression    • Diverticulosis    • Hypertension    • Hypothyroidism    • Sleep apnea     CPAP AT HOME      Past Surgical History:   Procedure " "Laterality Date   • CERVICAL CONE BIOPSY     • COLONOSCOPY     • LUMBAR LAMINECTOMY DISCECTOMY DECOMPRESSION     • ORIF HUMERUS FRACTURE Right 12/8/2020    Procedure: HUMERUS PROXIMAL OPEN REDUCTION INTERNAL FIXATION RIGHT;  Surgeon: Moris Acosta MD;  Location: [facility];  Service: Orthopedics;  Laterality: Right;   • TONSILLECTOMY       Family History   Problem Relation Age of Onset   • Breast cancer Mother    • Cancer Father    • Alcohol abuse Father      Social History     Tobacco Use   • Smoking status: Never Smoker   • Smokeless tobacco: Never Used   Vaping Use   • Vaping Use: Never used   Substance Use Topics   • Alcohol use: Not Currently     Comment: occasionally   • Drug use: Never     Medications Prior to Admission   Medication Sig Dispense Refill Last Dose   • amLODIPine (NORVASC) 2.5 MG tablet Take 2.5 mg by mouth Every Morning.      • Cholecalciferol 25 MCG (1000 UT) capsule Take 1,000 Units by mouth Daily.      • cyanocobalamin ([employer] Vitamin B-12) 1000 MCG tablet Take  by mouth.      • multivitamin (THERAGRAN) tablet tablet Take  by mouth.      • Synthroid 75 MCG tablet Take 75 mcg by mouth Daily.      • VENLAFAXINE HCL PO 1 tab(s) orally once a day (in the evening)      • venlafaxine XR (EFFEXOR-XR) 150 MG 24 hr capsule Take 150 mg by mouth Daily.        Allergies:  Celecoxib, Latex, Sulfa antibiotics, and Penicillins    Review of Systems  Pertinent items are noted in HPI, all other systems reviewed and negative    Objective     Vital Signs   /73 (BP Location: Right arm, Patient Position: Lying)   Pulse 79   Temp 97.6 °F (36.4 °C) (Oral)   Resp 18   Ht 157.5 cm (62\")   Wt 90.6 kg (199 lb 12.8 oz)   SpO2 96%   BMI 36.54 kg/m²     Physical Exam:  General Appearance: No apparent distress  Back: No No kyphosis present, no scoliosis present,no tenderness to percussion or Palpation  Lungs: Respirations regular, even and  unlabored  Heart: Regular rhythm and normal rate  Chest Wall: No " abnormalities observed  Abdomen: Benign abdomen with left costovertebral angle tenderness  Genital: Deferred  Rectal: Deferred  Extremities: Moves all extremities well, no edema no cyanosis, no redness  Pulses: Pulses palpable  Skin: No bleeding, bruising or rash  Lymph nodes: No palpable adenopathy  Neurologic: Neurologically grossly intact    Results Review:  Lab Results (last 24 hours)     Procedure Component Value Units Date/Time    Blood Culture - Blood, Hand, Right [[id number]] Collected: 09/05/22 2156    Specimen: Blood from Hand, Right Updated: 09/06/22 0711    Blood Culture - Blood, Arm, Left [[id number]] Collected: 09/05/22 2156    Specimen: Blood from Arm, Left Updated: 09/06/22 0711    COVID PRE-OP / PRE-PROCEDURE SCREENING ORDER (NO ISOLATION) - Swab, Nasopharynx [[id number]]  (Normal) Collected: 09/06/22 0300    Specimen: Swab from Nasopharynx Updated: 09/06/22 0545    Narrative:      The following orders were created for panel order COVID PRE-OP / PRE-PROCEDURE SCREENING ORDER (NO ISOLATION) - Swab, Nasopharynx.  Procedure                               Abnormality         Status                     ---------                               -----------         ------                     COVID-19, ABBOTT IN-HOUS...[[id number]]  Normal              Final result                 Please view results for these tests on the individual orders.    COVID-19, ABBOTT IN-HOUSE,NASAL Swab (NO TRANSPORT MEDIA) 2 HR TAT - Swab, Nasopharynx [[id number]]  (Normal) Collected: 09/06/22 0300    Specimen: Swab from Nasopharynx Updated: 09/06/22 0545     COVID19 Presumptive Negative    Narrative:      Fact sheet for providers: https://www.fda.gov/media/716408/download     Fact sheet for patients: https://www.fda.gov/media/166771/download    Test performed by PCR.  If inconsistent with clinical signs and symptoms patient should be tested with different authorized molecular test.    Basic Metabolic Panel [[id number]]  (Abnormal)  Collected: 09/06/22 0437    Specimen: Blood Updated: 09/06/22 0533     Glucose 99 mg/dL      BUN 15 mg/dL      Creatinine 0.71 mg/dL      Sodium 143 mmol/L      Potassium 4.1 mmol/L      Chloride 108 mmol/L      CO2 28.0 mmol/L      Calcium 9.0 mg/dL      BUN/Creatinine Ratio 21.1     Anion Gap 7.0 mmol/L      eGFR 92.2 mL/min/1.73      Comment: National Kidney Foundation and American Society of Nephrology (ASN) Task Force recommended calculation based on the Chronic Kidney Disease Epidemiology Collaboration (CKD-EPI) equation refit without adjustment for race.       Narrative:      GFR Normal >60  Chronic Kidney Disease <60  Kidney Failure <15      CBC Auto Differential [[id number]]  (Abnormal) Collected: 09/06/22 0437    Specimen: Blood Updated: 09/06/22 0522     WBC 11.80 10*3/mm3      RBC 4.29 10*6/mm3      Hemoglobin 12.7 g/dL      Hematocrit 37.7 %      MCV 87.9 fL      MCH 29.6 pg      MCHC 33.7 g/dL      RDW 13.6 %      RDW-SD 43.9 fl      MPV 9.5 fL      Platelets 265 10*3/mm3      Neutrophil % 64.6 %      Lymphocyte % 24.7 %      Monocyte % 8.9 %      Eosinophil % 1.1 %      Basophil % 0.4 %      Immature Grans % 0.3 %      Neutrophils, Absolute 7.62 10*3/mm3      Lymphocytes, Absolute 2.92 10*3/mm3      Monocytes, Absolute 1.05 10*3/mm3      Eosinophils, Absolute 0.13 10*3/mm3      Basophils, Absolute 0.05 10*3/mm3      Immature Grans, Absolute 0.03 10*3/mm3      nRBC 0.0 /100 WBC     Magnesium [[id number]]  (Normal) Collected: 09/05/22 1847    Specimen: Blood Updated: 09/06/22 0218     Magnesium 2.2 mg/dL     Hemoglobin A1c [[id number]]  (Normal) Collected: 09/05/22 1847    Specimen: Blood Updated: 09/06/22 0106     Hemoglobin A1C 5.40 %     Narrative:      Hemoglobin A1C Ranges:    Increased Risk for Diabetes  5.7% to 6.4%  Diabetes                     >= 6.5%  Diabetic Goal                < 7.0%    Procalcitonin [[id number]]  (Normal) Collected: 09/05/22 1847    Specimen: Blood Updated: 09/05/22 2333      "Procalcitonin <0.02 ng/mL     Narrative:      As a Marker for Sepsis (Non-Neonates):    1. <0.5 ng/mL represents a low risk of severe sepsis and/or septic shock.  2. >2 ng/mL represents a high risk of severe sepsis and/or septic shock.    As a Marker for Lower Respiratory Tract Infections that require antibiotic therapy:    PCT on Admission    Antibiotic Therapy       6-12 Hrs later    >0.5                Strongly Recommended  >0.25 - <0.5        Recommended   0.1 - 0.25          Discouraged              Remeasure/reassess PCT  <0.1                Strongly Discouraged     Remeasure/reassess PCT    As 28 day mortality risk marker: \"Change in Procalcitonin Result\" (>80% or <=80%) if Day 0 (or Day 1) and Day 4 values are available. Refer to http://www.[facility]-pct-calculator.com    Change in PCT <=80%  A decrease of PCT levels below or equal to 80% defines a positive change in PCT test result representing a higher risk for 28-day all-cause mortality of patients diagnosed with severe sepsis for septic shock.    Change in PCT >80%  A decrease of PCT levels of more than 80% defines a negative change in PCT result representing a lower risk for 28-day all-cause mortality of patients diagnosed with severe sepsis or septic shock.       [place] Draw [[id number]] Collected: 09/05/22 1847    Specimen: Blood Updated: 09/05/22 2302    Narrative:      The following orders were created for panel order [place] Draw.  Procedure                               Abnormality         Status                     ---------                               -----------         ------                     Green Top (Gel)[[id number]]                                  Final result               Lavender Top[[id number]]                                     Final result               Gold Top - SST[[id number]]                                   Final result               Crespo Top[[id number]]                                         Final result               Light " Blue Top[[id number]]                                   Final result                 Please view results for these tests on the individual orders.    Crespo Top [[id number]] Collected: 09/05/22 1847    Specimen: Blood Updated: 09/05/22 2302     Extra Tube Hold for add-ons.     Comment: Auto resulted.       STAT Lactic Acid, Reflex [[id number]]  (Normal) Collected: 09/05/22 2147    Specimen: Blood Updated: 09/05/22 2211     Lactate 1.3 mmol/L      Comment: Falsely depressed results may occur on samples drawn from patients receiving N-Acetylcysteine (NAC) or Metamizole.       Urine Culture - Urine, Urine, Clean Catch [[id number]] Collected: 09/05/22 1904    Specimen: Urine, Clean Catch Updated: 09/05/22 2157    Green Top (Gel) [[id number]] Collected: 09/05/22 1847    Specimen: Blood Updated: 09/05/22 2002     Extra Tube Hold for add-ons.     Comment: Auto resulted.       Gold Top - SST [[id number]] Collected: 09/05/22 1847    Specimen: Blood Updated: 09/05/22 2002     Extra Tube Hold for add-ons.     Comment: Auto resulted.       Lavender Top [[id number]] Collected: 09/05/22 1847    Specimen: Blood Updated: 09/05/22 2002     Extra Tube hold for add-on     Comment: Auto resulted       Light Blue Top [[id number]] Collected: 09/05/22 1847    Specimen: Blood Updated: 09/05/22 2002     Extra Tube Hold for add-ons.     Comment: Auto resulted       Urinalysis With Microscopic If Indicated (No Culture) - Urine, Clean Catch [[id number]]  (Abnormal) Collected: 09/05/22 1904    Specimen: Urine, Clean Catch Updated: 09/05/22 1941     Color, UA Red     Appearance, UA Turbid     pH, UA 6.5     Specific Gravity, UA 1.024     Glucose, UA Negative     Ketones, UA Negative     Bilirubin, UA Negative     Blood, UA Large (3+)     Protein,  mg/dL (2+)     Leuk Esterase, UA Moderate (2+)     Nitrite, UA Negative     Urobilinogen, UA 1.0 E.U./dL    Urinalysis, Microscopic Only - Urine, Clean Catch [[id number]]  (Abnormal) Collected: 09/05/22 1904     Specimen: Urine, Clean Catch Updated: 09/05/22 1941     RBC, UA Too Numerous to Count /HPF      WBC, UA Too Numerous to Count /HPF      Bacteria, UA 1+ /HPF      Squamous Epithelial Cells, UA 3-6 /HPF      Hyaline Casts, UA None Seen /LPF      Methodology Manual Light Microscopy    Lactic Acid, Plasma [[id number]]  (Abnormal) Collected: 09/05/22 1847    Specimen: Blood Updated: 09/05/22 1919     Lactate 2.6 mmol/L      Comment: Falsely depressed results may occur on samples drawn from patients receiving N-Acetylcysteine (NAC) or Metamizole.       Comprehensive Metabolic Panel [[id number]]  (Abnormal) Collected: 09/05/22 1847    Specimen: Blood Updated: 09/05/22 1919     Glucose 142 mg/dL      BUN 19 mg/dL      Creatinine 0.89 mg/dL      Sodium 139 mmol/L      Potassium 4.1 mmol/L      Comment: Slight hemolysis detected by analyzer. Results may be affected.        Chloride 105 mmol/L      CO2 22.0 mmol/L      Calcium 9.6 mg/dL      Total Protein 7.9 g/dL      Albumin 4.30 g/dL      ALT (SGPT) 7 U/L      AST (SGOT) 16 U/L      Alkaline Phosphatase 112 U/L      Total Bilirubin 0.3 mg/dL      Globulin 3.6 gm/dL      Comment: Calculated Result        A/G Ratio 1.2 g/dL      BUN/Creatinine Ratio 21.3     Anion Gap 12.0 mmol/L      eGFR 70.3 mL/min/1.73      Comment: National Kidney Foundation and American Society of Nephrology (ASN) Task Force recommended calculation based on the Chronic Kidney Disease Epidemiology Collaboration (CKD-EPI) equation refit without adjustment for race.       Narrative:      GFR Normal >60  Chronic Kidney Disease <60  Kidney Failure <15      Lipase [[id number]]  (Normal) Collected: 09/05/22 1847    Specimen: Blood Updated: 09/05/22 1919     Lipase 31 U/L     CBC & Differential [[id number]]  (Abnormal) Collected: 09/05/22 1847    Specimen: Blood Updated: 09/05/22 1901    Narrative:      The following orders were created for panel order CBC & Differential.  Procedure                                Abnormality         Status                     ---------                               -----------         ------                     CBC Auto Differential[[id number]]        Abnormal            Final result                 Please view results for these tests on the individual orders.    CBC Auto Differential [[id number]]  (Abnormal) Collected: 09/05/22 1847    Specimen: Blood Updated: 09/05/22 1901     WBC 17.14 10*3/mm3      RBC 4.86 10*6/mm3      Hemoglobin 14.0 g/dL      Hematocrit 42.0 %      MCV 86.4 fL      MCH 28.8 pg      MCHC 33.3 g/dL      RDW 13.3 %      RDW-SD 42.1 fl      MPV 9.4 fL      Platelets 315 10*3/mm3      Neutrophil % 68.9 %      Lymphocyte % 21.4 %      Monocyte % 7.8 %      Eosinophil % 1.2 %      Basophil % 0.4 %      Immature Grans % 0.3 %      Neutrophils, Absolute 11.81 10*3/mm3      Lymphocytes, Absolute 3.67 10*3/mm3      Monocytes, Absolute 1.34 10*3/mm3      Eosinophils, Absolute 0.20 10*3/mm3      Basophils, Absolute 0.07 10*3/mm3      Immature Grans, Absolute 0.05 10*3/mm3      nRBC 0.0 /100 WBC         Imaging Results (Last 72 Hours)     Procedure Component Value Units Date/Time    CT Abdomen Pelvis With Contrast [[id number]] Collected: 09/05/22 2127     Updated: 09/05/22 2232    Addenda:        Please note there is a complex cystic structure within the left   adnexa/ovary measuring 6.3 x 4.8 cm in diameter which could potentially   represent a solid lesion is well given its high density. An ultrasound is   recommended for further evaluation.    Electronically signed by:  Kaleb Salgado D.O.    9/5/2022 8:31 PM  Signed: 09/05/22 2231 by Kaleb Salgado DO    Narrative:      EXAMINATION: CT ABDOMEN AND PELVIS WITH IV CONTRAST       DATE OF EXAMINATION: 9/5/2022.    COMPARISON: None available.    INDICATION: Left flank pain.    PROCEDURE:  Axial CT of the abdomen and pelvis was performed with contrast and sagittal and coronal reformatted images were performed.  100 mL of  Isovue-300 was given intravenously. CT dose lowering techniques were used, to include: automated exposure   control, adjustment for patient size, and/or use of iterative reconstruction.    FINDINGS:    LOWER CHEST :  The visualized lung bases are clear.  There are no pleural or pericardial effusions.    ABDOMEN:    Liver and Biliary system:  There are a few subcentimeter hypodensities within the liver that are too small to fully characterize. A simple cyst is seen within the left lobe of liver measuring 1.4 cm in diameter. No suspicious liver lesions are otherwise   seen.    Adrenal glands:  Normal.    Kidneys and ureters: There is a 1.1 x 0.7 cm stone within the left ureteropelvic junction with mild left-sided hydronephrosis. The right kidney and ureter otherwise appear unremarkable    Spleen:  Normal.    Pancreas:  Normal.    Gallbladder:  Numerous calcified gallstones are seen in the dependent portion of the gallbladder.    Lymph nodes, Peritoneum and mesentery:  There is no mesenteric or retroperitoneal lymphadenopathy.    Gastrointestinal tract:  There are no dilated loops of bowel or free intraperitoneal air.    The pelvis is not clearly seen. There is some scattered colonic diverticuli without evidence of diverticulitis which is moderate within the sigmoid colonic   region.     Aorta/IVC:   There is mild vascular calcification throughout the abdominal aorta without evidence of aneurysmal dilation or dissection.  IVC normal.    Abdominal wall:  Normal.    PELVIS:    Fluid: There is no free fluid in the pelvis.    Lymph Nodes:  There is no pelvic or inguinal lymphadenopathy..    Urinary bladder:  Normal.    BONES:  There are no osseous destructive lesions..    ADDITIONAL  SIGNIFICANT FINDINGS:  None.      Impression:          1. 1.1 x 0.7 cm stone in the left ureteropelvic junction with mild left-sided hydronephrosis.  2. Diverticulosis without evidence of diverticulitis.  3.  Cholelithiasis.          Electronically signed by:  Kaleb Salgado D.O.    9/5/2022 7:30 PM Mountain Time           I reviewed the patient's new clinical results.      Assessment and Recommendations  Left ureteral stone  Likely UTI/pyelonephritis  Leukocytosis    At this point we will plan for ureteral stent placement and delayed disposition of the stone.  She needs 10 to 14 days of antibiotics once stent is in place before we endeavor into shockwave lithotripsy which I think is the most appropriate approach for the stone.  I talked to the patient and her  about risk benefits and alternatives they understand and wish to proceed with cystoscopy and left ureteral stent placement given their full consent.      Acute left flank pain    Acute UTI (urinary tract infection)    Adnexal mass    Obstruction of left ureteropelvic junction (UPJ) due to stone      I discussed the patients findings and my recommendations with patient and nursing staff    Yovany Bolden Jr., MD  09/06/22  10:07 EDT

## 2022-09-06 NOTE — ANESTHESIA POSTPROCEDURE EVALUATION
Patient: Génesis Bañuelos    Procedure Summary     Date: 09/06/22 Room / Location:  [facility] 07 /  [facility]    Anesthesia Start: 1353 Anesthesia Stop: 1426    Procedure: CYSTOSCOPY URETERAL CATHETER/STENT INSERTION (N/A ) Diagnosis:     Surgeons: Yovany Bolden Jr., MD Provider: Oc Barney MD    Anesthesia Type: general ASA Status: 3          Anesthesia Type: general    Vitals  Vitals Value Taken Time   BP 92/56 09/06/22 1423   Temp 97.2 °F (36.2 °C) 09/06/22 1421   Pulse 77 09/06/22 1426   Resp 16 09/06/22 1421   SpO2 94 % 09/06/22 1426   Vitals shown include unvalidated device data.        Post Anesthesia Care and Evaluation    Patient location during evaluation: PACU  Patient participation: complete - patient participated  Level of consciousness: awake  Pain score: 0  Pain management: adequate    Airway patency: patent  Anesthetic complications: No anesthetic complications  PONV Status: none  Cardiovascular status: acceptable and stable  Respiratory status: nasal cannula, unassisted, acceptable, spontaneous ventilation and oral airway  Hydration status: acceptable

## 2022-09-06 NOTE — ANESTHESIA PROCEDURE NOTES
Airway  Urgency: elective    Date/Time: 9/6/2022 1:57 PM  Airway not difficult    General Information and Staff    Patient location during procedure: OR  CRNA/CAA: Ty Orellana CRNA    Indications and Patient Condition  Indications for airway management: airway protection    Preoxygenated: yes  Mask difficulty assessment: 1 - vent by mask    Final Airway Details  Final airway type: supraglottic airway      Successful airway: I-gel  Size 4    Number of attempts at approach: 1  Assessment: lips, teeth, and gum same as pre-op    Additional Comments  LMA placed without difficulty, ventilation with assist, equal breath sounds and symmetric chest rise and fall

## 2022-09-06 NOTE — CASE MANAGEMENT/SOCIAL WORK
Discharge Planning Assessment  [facility]     Patient Name: Génesis Bañuelos  MRN: [id number]  Today's Date: 9/6/2022    Admit Date: 9/5/2022     Discharge Needs Assessment     Row Name 09/06/22 0848       Living Environment    People in Home spouse    Current Living Arrangements home    Primary Care Provided by self    Provides Primary Care For no one    Family Caregiver if Needed spouse    Quality of Family Relationships unable to assess    Able to Return to Prior Arrangements yes       Resource/Environmental Concerns    Resource/Environmental Concerns none       Transition Planning    Patient/Family Anticipates Transition to home    Patient/Family Anticipated Services at Transition     Transportation Anticipated family or friend will provide       Discharge Needs Assessment    Readmission Within the Last 30 Days no previous admission in last 30 days    Equipment Currently Used at Home cpap    Concerns to be Addressed discharge planning    Anticipated Changes Related to Illness none               Discharge Plan     Row Name 09/06/22 0850       Plan    Plan Home    Plan Comments Spoke with patient in room to initiate discharge planning.  She lives with her  in [facility].  She is independent with ADL's.  She has a CPAP at home and is not current with ;home health.  Her PCP is Karli Maradiaga.  She does not have an advanced directive.  Ms. Freeman has RX coverage and has her scripts filled at [facility].  Her plan is to return ho at discharge.  She denies any needs at this time.  CM will continue to follow.    Final Discharge Disposition Code 01 - home or self-care              Continued Care and Services - Admitted Since 9/5/2022    Coordination has not been started for this encounter.       Expected Discharge Date and Time     Expected Discharge Date Expected Discharge Time    Sep 8, 2022          Demographic Summary     Row Name 09/06/22 0848       General Information    Admission Type  inpatient    Arrived From emergency department    Referral Source admission list    Reason for Consult discharge planning    Preferred Language English               Functional Status     Row Name 09/06/22 0848       Functional Status    Usual Activity Tolerance moderate    Current Activity Tolerance moderate       Functional Status, IADL    Medications independent    Meal Preparation independent    Housekeeping independent    Laundry independent    Shopping independent               Psychosocial    No documentation.                Abuse/Neglect    No documentation.                Legal    No documentation.                Substance Abuse    No documentation.                Patient Forms    No documentation.                   Marlyn Friedman RN

## 2022-09-06 NOTE — ANESTHESIA PREPROCEDURE EVALUATION
Anesthesia Evaluation     Patient summary reviewed and Nursing notes reviewed                Airway   Mallampati: II  TM distance: >3 FB  Neck ROM: full  No difficulty expected  Dental - normal exam     Pulmonary - normal exam   (+) sleep apnea on CPAP,   Cardiovascular - normal exam    (+) hypertension,       Neuro/Psych- negative ROS  GI/Hepatic/Renal/Endo    (+) morbid obesity,  renal disease (Left UPJ stone), thyroid problem hypothyroidism    Musculoskeletal (-) negative ROS    Abdominal  - normal exam    Bowel sounds: normal.   Substance History - negative use     OB/GYN negative ob/gyn ROS         Other                        Anesthesia Plan    ASA 3     general     intravenous induction     Anesthetic plan, risks, benefits, and alternatives have been provided, discussed and informed consent has been obtained with: patient.    Plan discussed with CRNA.        CODE STATUS:    Code Status (Patient has no pulse and is not breathing): CPR (Attempt to Resuscitate)  Medical Interventions (Patient has pulse or is breathing): Full Support

## 2022-09-06 NOTE — OP NOTE
CYSTOSCOPY URETERAL CATHETER/STENT INSERTION  Procedure Note    Génesis Bañuelos  9/6/2022    Pre-op Diagnosis:   Proximal left ureteral stone  UTI    Post-op Diagnosis:     Proximal left ureteral stone  UTI    Procedure/CPT® Codes:      Procedure(s):  CYSTOSCOPY LEFT URETERAL CATHETER/STENT INSERTION    Surgeon(s):  Yovany Bolden Jr., MD    Anesthesia: General    Staff:   Circulator: Lorene Villanueva RN  Scrub Person: Mariya Mckeon      Was needed to assist in this case with retraction, exposure, instrument placement.    Estimated Blood Loss: minimal  Urine Voided: * No values recorded between 9/6/2022  1:53 PM and 9/6/2022  2:05 PM *    Specimens:                None      Drains: * No LDAs found *    Findings: Radiopaque proximal left ureteral stone    Complications: None    History: 67-year-old female with left proximal ureteral stone and UTI.  She opts for stent placement with delayed disposition of stone understanding risk benefits and alternatives.  She gives her full consent.    Operative Report: After consent was obtained patient's brought to the operating suite where she is placed in supine position.  Anesthesia was induced.  She is carefully placed in dorsal lithotomy position padding all pressure points.  She sterilely prepped draped normal fashion scope inserted the patient urethra and bladder atraumatically pan cystoscopy was performed there are no bladder tumors or bladder stones seen left ureteral orifice is identified and wire was advanced into the ureteral orifice under direct vision and then under fluoroscopic guidance we did advance the wire beyond the proximal radiopaque proximal ureteral stone with a nice curl in the renal pelvis.  Over the wire under direct vision a 4.8 x 26 double-J ureteral stent was placed with nice curl in renal pelvis nice curl in the bladder.  Anesthesia was reversed after the bladder was emptied patient was taken the recovery room stable condition is stable  condition    Yovany Bolden Jr., MD     Date: 9/6/2022  Time: 14:05 EDT

## 2022-09-07 VITALS
TEMPERATURE: 96.7 F | RESPIRATION RATE: 17 BRPM | BODY MASS INDEX: 36.76 KG/M2 | WEIGHT: 199.74 LBS | HEART RATE: 79 BPM | OXYGEN SATURATION: 96 % | HEIGHT: 62 IN | DIASTOLIC BLOOD PRESSURE: 85 MMHG | SYSTOLIC BLOOD PRESSURE: 131 MMHG

## 2022-09-07 PROBLEM — N20.1 OBSTRUCTION OF LEFT URETEROPELVIC JUNCTION (UPJ) DUE TO STONE: Status: RESOLVED | Noted: 2022-09-06 | Resolved: 2022-09-07

## 2022-09-07 LAB
ANION GAP SERPL CALCULATED.3IONS-SCNC: 10 MMOL/L (ref 5–15)
BUN SERPL-MCNC: 13 MG/DL (ref 8–23)
BUN/CREAT SERPL: 18.1 (ref 7–25)
CALCIUM SPEC-SCNC: 9.1 MG/DL (ref 8.6–10.5)
CHLORIDE SERPL-SCNC: 108 MMOL/L (ref 98–107)
CO2 SERPL-SCNC: 24 MMOL/L (ref 22–29)
CREAT SERPL-MCNC: 0.72 MG/DL (ref 0.57–1)
DEPRECATED RDW RBC AUTO: 44 FL (ref 37–54)
EGFRCR SERPLBLD CKD-EPI 2021: 90.6 ML/MIN/1.73
ERYTHROCYTE [DISTWIDTH] IN BLOOD BY AUTOMATED COUNT: 13.6 % (ref 12.3–15.4)
GLUCOSE SERPL-MCNC: 121 MG/DL (ref 65–99)
HCT VFR BLD AUTO: 37.2 % (ref 34–46.6)
HGB BLD-MCNC: 12.2 G/DL (ref 12–15.9)
MCH RBC QN AUTO: 29 PG (ref 26.6–33)
MCHC RBC AUTO-ENTMCNC: 32.8 G/DL (ref 31.5–35.7)
MCV RBC AUTO: 88.4 FL (ref 79–97)
PLATELET # BLD AUTO: 246 10*3/MM3 (ref 140–450)
PMV BLD AUTO: 9.6 FL (ref 6–12)
POTASSIUM SERPL-SCNC: 4.6 MMOL/L (ref 3.5–5.2)
RBC # BLD AUTO: 4.21 10*6/MM3 (ref 3.77–5.28)
SODIUM SERPL-SCNC: 142 MMOL/L (ref 136–145)
WBC NRBC COR # BLD: 9.07 10*3/MM3 (ref 3.4–10.8)

## 2022-09-07 PROCEDURE — 80048 BASIC METABOLIC PNL TOTAL CA: CPT | Performed by: HOSPITALIST

## 2022-09-07 PROCEDURE — 85027 COMPLETE CBC AUTOMATED: CPT | Performed by: HOSPITALIST

## 2022-09-07 PROCEDURE — 99213 OFFICE O/P EST LOW 20 MIN: CPT | Performed by: NURSE PRACTITIONER

## 2022-09-07 PROCEDURE — G0378 HOSPITAL OBSERVATION PER HR: HCPCS

## 2022-09-07 RX ORDER — ONDANSETRON 2 MG/ML
4 INJECTION INTRAMUSCULAR; INTRAVENOUS EVERY 6 HOURS PRN
Status: DISCONTINUED | OUTPATIENT
Start: 2022-09-07 | End: 2022-09-07 | Stop reason: HOSPADM

## 2022-09-07 RX ORDER — TAMSULOSIN HYDROCHLORIDE 0.4 MG/1
0.4 CAPSULE ORAL DAILY
Status: DISCONTINUED | OUTPATIENT
Start: 2022-09-07 | End: 2022-09-07 | Stop reason: HOSPADM

## 2022-09-07 RX ORDER — CEFDINIR 300 MG/1
300 CAPSULE ORAL 2 TIMES DAILY
Qty: 28 CAPSULE | Refills: 1 | Status: SHIPPED | OUTPATIENT
Start: 2022-09-07

## 2022-09-07 RX ORDER — TAMSULOSIN HYDROCHLORIDE 0.4 MG/1
0.4 CAPSULE ORAL DAILY
Qty: 30 CAPSULE | Refills: 1 | Status: SHIPPED | OUTPATIENT
Start: 2022-09-08

## 2022-09-07 RX ORDER — BISACODYL 5 MG/1
5 TABLET, DELAYED RELEASE ORAL DAILY PRN
Status: DISCONTINUED | OUTPATIENT
Start: 2022-09-07 | End: 2022-09-07 | Stop reason: HOSPADM

## 2022-09-07 RX ORDER — HYDROCODONE BITARTRATE AND ACETAMINOPHEN 5; 325 MG/1; MG/1
1 TABLET ORAL EVERY 4 HOURS PRN
Qty: 12 TABLET | Refills: 0 | Status: SHIPPED | OUTPATIENT
Start: 2022-09-07

## 2022-09-07 RX ORDER — AMOXICILLIN 250 MG
2 CAPSULE ORAL 2 TIMES DAILY
Status: DISCONTINUED | OUTPATIENT
Start: 2022-09-07 | End: 2022-09-07 | Stop reason: HOSPADM

## 2022-09-07 RX ORDER — HYDROMORPHONE HYDROCHLORIDE 1 MG/ML
0.5 INJECTION, SOLUTION INTRAMUSCULAR; INTRAVENOUS; SUBCUTANEOUS
Status: DISCONTINUED | OUTPATIENT
Start: 2022-09-07 | End: 2022-09-07 | Stop reason: HOSPADM

## 2022-09-07 RX ORDER — FAMOTIDINE 40 MG/1
40 TABLET, FILM COATED ORAL DAILY
Qty: 30 TABLET | Refills: 1 | Status: SHIPPED | OUTPATIENT
Start: 2022-09-08

## 2022-09-07 RX ORDER — ONDANSETRON 4 MG/1
4 TABLET, ORALLY DISINTEGRATING ORAL EVERY 8 HOURS PRN
Qty: 20 TABLET | Refills: 1 | Status: SHIPPED | OUTPATIENT
Start: 2022-09-07

## 2022-09-07 RX ORDER — ONDANSETRON 4 MG/1
4 TABLET, FILM COATED ORAL EVERY 6 HOURS PRN
Status: DISCONTINUED | OUTPATIENT
Start: 2022-09-07 | End: 2022-09-07 | Stop reason: HOSPADM

## 2022-09-07 RX ORDER — POLYETHYLENE GLYCOL 3350 17 G/17G
17 POWDER, FOR SOLUTION ORAL DAILY PRN
Status: DISCONTINUED | OUTPATIENT
Start: 2022-09-07 | End: 2022-09-07 | Stop reason: HOSPADM

## 2022-09-07 RX ORDER — LEVOTHYROXINE SODIUM 0.07 MG/1
75 TABLET ORAL
Status: DISCONTINUED | OUTPATIENT
Start: 2022-09-07 | End: 2022-09-07 | Stop reason: HOSPADM

## 2022-09-07 RX ORDER — BISACODYL 10 MG
10 SUPPOSITORY, RECTAL RECTAL DAILY PRN
Status: DISCONTINUED | OUTPATIENT
Start: 2022-09-07 | End: 2022-09-07 | Stop reason: HOSPADM

## 2022-09-07 RX ADMIN — FAMOTIDINE 40 MG: 20 TABLET ORAL at 08:28

## 2022-09-07 RX ADMIN — SENNOSIDES AND DOCUSATE SODIUM 2 TABLET: 50; 8.6 TABLET ORAL at 09:30

## 2022-09-07 RX ADMIN — TAMSULOSIN HYDROCHLORIDE 0.4 MG: 0.4 CAPSULE ORAL at 09:29

## 2022-09-07 RX ADMIN — AMLODIPINE BESYLATE 2.5 MG: 2.5 TABLET ORAL at 08:28

## 2022-09-07 RX ADMIN — LEVOTHYROXINE SODIUM 75 MCG: 0.07 TABLET ORAL at 09:30

## 2022-09-07 NOTE — CASE MANAGEMENT/SOCIAL WORK
Case Management Discharge Note      Final Note: Patient's plan is still to return home at discharge.  No needs noted.         Selected Continued Care - Admitted Since 9/5/2022     Destination    No services have been selected for the patient.              Durable Medical Equipment    No services have been selected for the patient.              Dialysis/Infusion    No services have been selected for the patient.              Home Medical Care    No services have been selected for the patient.              Therapy    No services have been selected for the patient.              Community Resources    No services have been selected for the patient.              Community & DME    No services have been selected for the patient.                       Final Discharge Disposition Code: 01 - home or self-care

## 2022-09-07 NOTE — PLAN OF CARE
Goal Outcome Evaluation:  Plan of Care Reviewed With: patient        Progress: improving  Outcome Evaluation: S/P Cystoscopy with uteral stent placement. Urine output pink tinged. C/O mild low abdominal pain 3/10 and burning sensation upon urination. Tylenol given as ordered and pt verbalized relief. Slept most of the night. Monitor shows SR/ST. Afebrile. VSS. Cont on straining urine. No concerns at this time. Cont with POC   Hgb 10 7 on admission, jail [facility]  Hgb stable at 9 7 yesterday  Per heme/onc note on 6/9: "reviewed labs from 05/18/2021 which reveal a hemoglobin of 9 9, platelet count 931, normal white count and MCV is 98  An iron panel was obtained to further evaluate her anemia which revealed a saturation of 22% and a ferritin level of 108 suggesting iron deficiency is not contributing to her anemia  Suspect her anemia is of chronic disease"   B12 and folate WNL this admission      Plan:  - continue to monitor outpatient

## 2022-09-07 NOTE — DISCHARGE SUMMARY
[facility]  DISCHARGE SUMMARY    Patient Name: Génesis Bañuelos  : 1955  MRN: [id number]    Date of Admission: 2022  6:40 PM  Date of Discharge: 2022  Primary Care Physician: Provider, No Known    Consults     Date and Time Order Name Status Description    2022 12:34 AM Inpatient Urology Consult Completed           Hospital Course   Presenting Problem:   Acute left flank pain [R10.9]    Active Hospital Problems    Diagnosis  POA   • Acute UTI (urinary tract infection) [N39.0]  Yes   • Adnexal mass [N94.89]  Yes   • Acute left flank pain [R10.9]  Yes      Resolved Hospital Problems    Diagnosis Date Resolved POA   • Obstruction of left ureteropelvic junction (UPJ) due to stone [N20.1] 2022 Yes      Hospital Course:  Génesis Bañuelos is a 67 y.o. female with PMH significant for HTN, hypothyroidism, depression, JUAN ANTONIO on CPAP who presents to the ED for evaluation of abdominal pain and hematuria that initially started on . [place] poorly when she woke up on , did not go to [employer], had small amount of blood in urine but felt better as the day went on and no further blood. Today, she was still having some blood in her urine, went to [place] and started having significant amount of pain, perfuse sweating, had to sit down and  took her to [facility] who then referred to the ED. Patient describes abdominal pain, hematuria, nausea, diaphoresis, fatigue and general malaise. Symptoms worse with movement. No fever or chills.     UTI/POA  Leukocytosis  -monitoringUTI/POA  -IV Rocephin changed to cefdinir x14 days  --Follow-up with PCP in 1 week     L UPJ stone  -pain control,   --urology follow-up in 10 to 14 days     Adnexal mass  -US  Stable normal ultrasound appearance of the uterus and endometrium.  Right ovary not visualized on ultrasound. Right ovary appears normal on  CT performed yesterday; Redemonstration of simple left ovarian cyst measuring 5.5  cm.  --OP follow-up with GYN    Patient will be discharged home today and her  will transport.  Discharge follow-up appointments and recommendations are listed below.    Discharge Follow Up Recommendations for outpatient labs/diagnostics:  --[place] up with your family doctor in 1 week  --Follow u\p with Dr Bolden in 2 weeks  --Take antibiotics as prescribed until gone  --Take Flomax as prescribed  Day of Discharge   HPI:   Patient sitting up in bed in NAD with no complaints.  Patient states she had a liver pain overnight took Tylenol.   recommended she get narcotics and Zofran just in case she started having pain.  Discussed follow-up with patient and her .  No other complaints or concerns.    Review of Systems  Gen- No fevers, chills  CV- No chest pain, palpitations  Resp- No cough, dyspnea  GI- No N/V/D, abd pain  All other systems have been reviewed and the pertinent positives and negatives are listed above in the HPI or ROS    Vital Signs:   Temp:  [96.7 °F (35.9 °C)-98 °F (36.7 °C)] 96.7 °F (35.9 °C)  Heart Rate:  [] 79  Resp:  [14-18] 17  BP: ()/(51-91) 131/85  Flow (L/min):  [2-6] 2  Physical Exam:  Constitutional: Alert, appears younger than stated age, WD, obese female sitting up in bed in NAD  Eyes: EOMI, sclerae anicteric, no conjunctival injection  Head: NCAT  ENT: [place], moist mucous membranes   Respiratory: Nonlabored, symmetrical chest expansion, CTAB, 95%RA  Cardiovascular: RRR,HR 80, no M/R/G, +DP pulses bilaterally  Gastrointestinal: Obese, Soft, NT, ND +BS  Musculoskeletal: CADET; no LE edema bilaterally  Neurologic: Oriented x4, strength symmetric in all extremities, follows all commands, speech clear  Skin: No rashes on exposed skin  Psychiatric: Pleasant and cooperative; normal affect  Pertinent  and/or Most Recent Results     LAB RESULTS:      Lab 09/07/22  0454 09/06/22  0437 09/05/22  2147 09/05/22  1847   WBC 9.07 11.80*  --  17.14*   HEMOGLOBIN 12.2 12.7  --   14.0   HEMATOCRIT 37.2 37.7  --  42.0   PLATELETS 246 265  --  315   NEUTROS ABS  --  7.62*  --  11.81*   IMMATURE GRANS (ABS)  --  0.03  --  0.05   LYMPHS ABS  --  2.92  --  3.67*   MONOS ABS  --  1.05*  --  1.34*   EOS ABS  --  0.13  --  0.20   MCV 88.4 87.9  --  86.4   PROCALCITONIN  --   --   --  <0.02   LACTATE  --   --  1.3 2.6*         Lab 09/07/22  0454 09/06/22  0437 09/05/22  1847   SODIUM 142 143 139   POTASSIUM 4.6 4.1 4.1   CHLORIDE 108* 108* 105   CO2 24.0 28.0 22.0   ANION GAP 10.0 7.0 12.0   BUN 13 15 19   CREATININE 0.72 0.71 0.89   EGFR 90.6 92.2 70.3   GLUCOSE 121* 99 142*   CALCIUM 9.1 9.0 9.6   MAGNESIUM  --   --  2.2   HEMOGLOBIN A1C  --   --  5.40         Lab 09/05/22  1847   TOTAL PROTEIN 7.9   ALBUMIN 4.30   GLOBULIN 3.6   ALT (SGPT) 7   AST (SGOT) 16   BILIRUBIN 0.3   ALK PHOS 112   LIPASE 31                     Brief Urine Lab Results  (Last result in the past 365 days)      Color   Clarity   Blood   Leuk Est   Nitrite   Protein   CREAT   Urine HCG        09/05/22 1904 Red   Turbid   Large (3+)   Moderate (2+)   Negative   100 mg/dL (2+)               Microbiology Results (last 10 days)     Procedure Component Value - Date/Time    COVID PRE-OP / PRE-PROCEDURE SCREENING ORDER (NO ISOLATION) - Swab, Nasopharynx [[id number]]  (Normal) Collected: 09/06/22 0300    Lab Status: Final result Specimen: Swab from Nasopharynx Updated: 09/06/22 0545    Narrative:      The following orders were created for panel order COVID PRE-OP / PRE-PROCEDURE SCREENING ORDER (NO ISOLATION) - Swab, Nasopharynx.  Procedure                               Abnormality         Status                     ---------                               -----------         ------                     COVID-19, ABBOTT IN-HOUS...[[id number]]  Normal              Final result                 Please view results for these tests on the individual orders.    COVID-19, ABBOTT IN-HOUSE,NASAL Swab (NO TRANSPORT MEDIA) 2 HR TAT - Swab,  Nasopharynx [[id number]]  (Normal) Collected: 09/06/22 0300    Lab Status: Final result Specimen: Swab from Nasopharynx Updated: 09/06/22 0545     COVID19 Presumptive Negative    Narrative:      Fact sheet for providers: https://www.fda.gov/media/175843/download     Fact sheet for patients: https://www.fda.gov/media/193764/download    Test performed by PCR.  If inconsistent with clinical signs and symptoms patient should be tested with different authorized molecular test.    Blood Culture - Blood, Hand, Right [[id number]]  (Normal) Collected: 09/05/22 2156    Lab Status: Preliminary result Specimen: Blood from Hand, Right Updated: 09/07/22 0717     Blood Culture No growth at 24 hours    Blood Culture - Blood, Arm, Left [[id number]]  (Normal) Collected: 09/05/22 2156    Lab Status: Preliminary result Specimen: Blood from Arm, Left Updated: 09/07/22 0717     Blood Culture No growth at 24 hours    Urine Culture - Urine, Urine, Clean Catch [[id number]] Collected: 09/05/22 1904    Lab Status: Final result Specimen: Urine, Clean Catch Updated: 09/06/22 1114     Urine Culture 25,000 CFU/mL Mixed Qi Isolated    Narrative:      Specimen contains mixed organisms of questionable pathogenicity suggestive of contamination. If symptoms persist, suggest recollection.  Colonization of the urinary tract without infection is common. Treatment is discouraged unless the patient is symptomatic, pregnant, or undergoing an invasive urologic procedure.          US Non-ob Transvaginal    Result Date: 9/6/2022  Examination: US NON-OB TRANSVAGINAL-  Date of Exam: 9/6/2022 7:53 PM  Indication: left adnexal cyst vs mass; R10.9-Unspecified abdominal pain; N20.1-Calculus of ureter; N13.30-Unspecified hydronephrosis.  Comparison: CT abdomen and pelvis performed 09/05/2022.  Technique: Grayscale and color Doppler ultrasound evaluation of the pelvis was performed utilizing transabdominal and transvaginal technique.  Findings: The uterus measures 6.0  x 2.2 x 3.2 cm. The uterus is mildly heterogeneous without new focal parenchymal abnormality. Stable small calcification in the uterine fundal myometrium.The endometrial stripe measures 2 mm.  The right ovary is not visualized and the left ovary measures 5.3 x 5.5 x 5.2 cm. Stable simple left ovarian cyst measuring 5.3 x 5.5 x 5.2 cm.  There is no significant free fluid identified within the pelvis.       1. Stable normal ultrasound appearance of the uterus and endometrium. Right ovary not visualized on ultrasound. Right ovary appears normal on CT performed yesterday. 3. Redemonstration of simple left ovarian cyst measuring 5.5 cm.  This report was finalized on 9/6/2022 10:47 PM by Calos James MD.      CT Abdomen Pelvis With Contrast    Addendum Date: 9/5/2022    Please note there is a complex cystic structure within the left adnexa/ovary measuring 6.3 x 4.8 cm in diameter which could potentially represent a solid lesion is well given its high density. An ultrasound is recommended for further evaluation. Electronically signed by:  Kaleb Salgado D.O.  9/5/2022 8:31 PM    Result Date: 9/5/2022  EXAMINATION: CT ABDOMEN AND PELVIS WITH IV CONTRAST   DATE OF EXAMINATION: 9/5/2022. COMPARISON: None available. INDICATION: Left flank pain. PROCEDURE:  Axial CT of the abdomen and pelvis was performed with contrast and sagittal and coronal reformatted images were performed.  100 mL of Isovue-300 was given intravenously. CT dose lowering techniques were used, to include: automated exposure control, adjustment for patient size, and/or use of iterative reconstruction. FINDINGS: LOWER CHEST :  The visualized lung bases are clear.  There are no pleural or pericardial effusions. ABDOMEN: Liver and Biliary system:  There are a few subcentimeter hypodensities within the liver that are too small to fully characterize. A simple cyst is seen within the left lobe of liver measuring 1.4 cm in diameter. No suspicious liver lesions are  otherwise seen. Adrenal glands:  Normal. Kidneys and ureters: There is a 1.1 x 0.7 cm stone within the left ureteropelvic junction with mild left-sided hydronephrosis. The right kidney and ureter otherwise appear unremarkable Spleen:  Normal. Pancreas:  Normal. Gallbladder:  Numerous calcified gallstones are seen in the dependent portion of the gallbladder. Lymph nodes, Peritoneum and mesentery:  There is no mesenteric or retroperitoneal lymphadenopathy. Gastrointestinal tract:  There are no dilated loops of bowel or free intraperitoneal air.    The pelvis is not clearly seen. There is some scattered colonic diverticuli without evidence of diverticulitis which is moderate within the sigmoid colonic region. Aorta/IVC:   There is mild vascular calcification throughout the abdominal aorta without evidence of aneurysmal dilation or dissection.  IVC normal. Abdominal wall:  Normal. PELVIS: Fluid: There is no free fluid in the pelvis. Lymph Nodes:  There is no pelvic or inguinal lymphadenopathy.. Urinary bladder:  Normal. BONES:  There are no osseous destructive lesions.. ADDITIONAL  SIGNIFICANT FINDINGS:  None.     1. 1.1 x 0.7 cm stone in the left ureteropelvic junction with mild left-sided hydronephrosis. 2. Diverticulosis without evidence of diverticulitis. 3. Cholelithiasis. Electronically signed by:  Kaleb Salgado D.O.  9/5/2022 7:30 PM Mountain Time    FL C Arm During Surgery    Result Date: 9/6/2022  DATE OF EXAM: 9/6/2022 2:00 PM  PROCEDURE: FL C ARM DURING SURGERY-  INDICATIONS: Cysto/stent; R10.9-Unspecified abdominal pain; N20.1-Calculus of ureter; N13.30-Unspecified hydronephrosis  COMPARISON: No comparisons available.      FINDINGS/IMPRESSION: 12 seconds of fluoroscopy time provided with 1 imaged toward during cystoscopy  This report was finalized on 9/6/2022 3:39 PM by Dev Queen.                    Plan for Follow-up of Pending Labs/Results:   Pending Labs     Order Current Status    Blood Culture  - Blood, Arm, Left Preliminary result    Blood Culture - Blood, Hand, Right Preliminary result        Discharge Details        Discharge Medications      New Medications      Instructions Start Date   cefdinir 300 MG capsule  Commonly known as: OMNICEF   300 mg, Oral, 2 Times Daily      famotidine 40 MG tablet  Commonly known as: PEPCID   40 mg, Oral, Daily   Start Date: September 8, 2022     HYDROcodone-acetaminophen 5-325 MG per tablet  Commonly known as: NORCO   1 tablet, Oral, Every 4 Hours PRN      ondansetron ODT 4 MG disintegrating tablet  Commonly known as: Zofran ODT   4 mg, Translingual, Every 8 Hours PRN      tamsulosin 0.4 MG capsule 24 hr capsule  Commonly known as: FLOMAX   0.4 mg, Oral, Daily   Start Date: September 8, 2022        Continue These Medications      Instructions Start Date   amLODIPine 2.5 MG tablet  Commonly known as: NORVASC   2.5 mg, Oral, Every Morning      Cholecalciferol 25 MCG (1000 UT) capsule   1,000 Units, Oral, Daily      [employer] Vitamin B-12 1000 MCG tablet  Generic drug: cyanocobalamin   Oral      multivitamin tablet tablet   Oral      Synthroid 75 MCG tablet  Generic drug: levothyroxine   75 mcg, Oral, Daily      VENLAFAXINE HCL PO   1 tab(s) orally once a day (in the evening)      venlafaxine  MG 24 hr capsule  Commonly known as: EFFEXOR-XR   150 mg, Oral, Daily             Allergies   Allergen Reactions   • Celecoxib Itching, Other (See Comments), Rash and Hives     Celebrex   • Sulfa Antibiotics Hives   • Penicillins Rash and Other (See Comments)     Penicillins         Discharge Disposition:  Home or Self Care    Diet:  Hospital:  Diet Order   Procedures   • Diet Regular       Activity:  Activity Instructions     Activity as Tolerated            Restrictions or Other Recommendations:  None       CODE STATUS:    Code Status and Medical Interventions:   Ordered at: 09/05/22 2946     Code Status (Patient has no pulse and is not breathing):    CPR (Attempt to Resuscitate)      Medical Interventions (Patient has pulse or is breathing):    Full Support       Additional Instructions for the Follow-ups that You Need to Schedule     Discharge Follow-up with PCP   As directed       Currently Documented PCP:    Provider, No Known    PCP Phone Number:    None     Follow Up Details: Follow up with your family doctor in 1 week         Discharge Follow-up with Specified Provider: Dr Bolden; 2 weeks; please schedule prior to dc   As directed      To: Dr Bolden; 2 weeks; please schedule prior to dc         Discharge Follow-up with Specified Provider: [facility] up with [facility] ASAP about adenexal/ovarian mass   As directed      To: Follow up with [facility] ASAP about adenexal/ovarian mass             Светлнаа Melendez, HANY  09/07/22      Time Spent on Discharge:  I spent 35 minutes on this discharge activity which included: face-to-face encounter with the patient, reviewing the data in the system, coordination of the care with the nursing staff as well as consultants, documentation, and entering orders.

## 2022-09-07 NOTE — PROGRESS NOTES
Clinically stable after stent placement yesterday    Leukocytosis resolved      We will follow-up with patient for shockwave lithotripsy in the next 10 to 14 days  Continue antibiotic therapy 10 to 14 days for treatment of pyelonephritis

## 2022-09-07 NOTE — DISCHARGE INSTR - APPOINTMENTS
Follow up Appointment:  Wednesday, September 29th@ 10:45  [phone]  Yovany Bolden Jr., MD Urology  [address]  [place] [address]           Follow up Appointment:Thursday, September 8th@ 2:30  [facility]  [phone]  [address]  [place] [address]

## 2022-09-08 ENCOUNTER — LAB (OUTPATIENT)
Dept: LAB | Facility: HOSPITAL | Age: 67
End: 2022-09-08

## 2022-09-08 ENCOUNTER — TRANSCRIBE ORDERS (OUTPATIENT)
Dept: LAB | Facility: HOSPITAL | Age: 67
End: 2022-09-08

## 2022-09-08 DIAGNOSIS — N83.202 BILATERAL OVARIAN CYSTS: Primary | ICD-10-CM

## 2022-09-08 DIAGNOSIS — N83.201 BILATERAL OVARIAN CYSTS: ICD-10-CM

## 2022-09-08 DIAGNOSIS — N83.202 BILATERAL OVARIAN CYSTS: ICD-10-CM

## 2022-09-08 DIAGNOSIS — N83.201 BILATERAL OVARIAN CYSTS: Primary | ICD-10-CM

## 2022-09-08 PROCEDURE — 86304 IMMUNOASSAY TUMOR CA 125: CPT

## 2022-09-08 PROCEDURE — 36415 COLL VENOUS BLD VENIPUNCTURE: CPT

## 2022-09-09 LAB — CANCER AG125 SERPL QL: 16.4 U/ML (ref 0–38.1)

## 2022-09-11 LAB
BACTERIA SPEC AEROBE CULT: NORMAL
BACTERIA SPEC AEROBE CULT: NORMAL

## 2023-12-26 ENCOUNTER — TRANSCRIBE ORDERS (OUTPATIENT)
Dept: ADMINISTRATIVE | Facility: HOSPITAL | Age: 68
End: 2023-12-26
Payer: MEDICARE

## 2023-12-26 DIAGNOSIS — Z13.820 OSTEOPOROSIS SCREENING: Primary | ICD-10-CM

## 2023-12-27 ENCOUNTER — TRANSCRIBE ORDERS (OUTPATIENT)
Dept: ADMINISTRATIVE | Facility: HOSPITAL | Age: 68
End: 2023-12-27
Payer: MEDICARE

## 2023-12-27 DIAGNOSIS — Z13.820 OSTEOPOROSIS SCREENING: Primary | ICD-10-CM

## 2023-12-27 DIAGNOSIS — M81.0 AGE-RELATED OSTEOPOROSIS WITHOUT CURRENT PATHOLOGICAL FRACTURE: ICD-10-CM

## 2024-01-22 ENCOUNTER — HOSPITAL ENCOUNTER (OUTPATIENT)
Dept: BONE DENSITY | Facility: HOSPITAL | Age: 69
Discharge: HOME | End: 2024-01-22
Admitting: CLINIC/CENTER
Payer: MEDICARE

## 2024-01-22 DIAGNOSIS — M81.0 AGE-RELATED OSTEOPOROSIS WITHOUT CURRENT PATHOLOGICAL FRACTURE: ICD-10-CM

## 2024-01-22 PROCEDURE — 77080 DXA BONE DENSITY AXIAL: CPT

## 2024-05-19 ENCOUNTER — APPOINTMENT (OUTPATIENT)
Dept: GENERAL RADIOLOGY | Facility: HOSPITAL | Age: 69
End: 2024-05-19
Payer: MEDICARE

## 2024-05-19 ENCOUNTER — HOSPITAL ENCOUNTER (EMERGENCY)
Facility: HOSPITAL | Age: 69
Discharge: HOME | End: 2024-05-20
Attending: EMERGENCY MEDICINE | Admitting: EMERGENCY MEDICINE
Payer: MEDICARE

## 2024-05-19 VITALS
TEMPERATURE: 97.9 F | BODY MASS INDEX: 33.68 KG/M2 | DIASTOLIC BLOOD PRESSURE: 84 MMHG | OXYGEN SATURATION: 99 % | SYSTOLIC BLOOD PRESSURE: 134 MMHG | HEIGHT: 62 IN | RESPIRATION RATE: 17 BRPM | HEART RATE: 79 BPM | WEIGHT: 183 LBS

## 2024-05-19 DIAGNOSIS — S52.502A CLOSED FRACTURE OF DISTAL END OF LEFT RADIUS, UNSPECIFIED FRACTURE MORPHOLOGY, INITIAL ENCOUNTER: Primary | ICD-10-CM
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
## 2024-05-19 DIAGNOSIS — S52.614A CLOSED NONDISPLACED FRACTURE OF STYLOID PROCESS OF RIGHT ULNA, INITIAL ENCOUNTER: ICD-10-CM

## 2024-05-19 DIAGNOSIS — M25.531 RIGHT WRIST PAIN: ICD-10-CM

## 2024-05-19 PROCEDURE — 99283 EMERGENCY DEPT VISIT LOW MDM: CPT

## 2024-05-19 PROCEDURE — 73110 X-RAY EXAM OF WRIST: CPT

## 2024-05-19 RX ORDER — OXYCODONE HYDROCHLORIDE AND ACETAMINOPHEN 5; 325 MG/1; MG/1
1 TABLET ORAL ONCE
Status: COMPLETED | OUTPATIENT
Start: 2024-05-19 | End: 2024-05-19

## 2024-05-19 RX ADMIN — OXYCODONE HYDROCHLORIDE AND ACETAMINOPHEN 1 TABLET: 5; 325 TABLET ORAL at 23:47

## 2024-05-20 RX ORDER — OXYCODONE HYDROCHLORIDE AND ACETAMINOPHEN 5; 325 MG/1; MG/1
1 TABLET ORAL EVERY 6 HOURS PRN
Qty: 12 TABLET | Refills: 0 | Status: SHIPPED | OUTPATIENT
Start: 2024-05-20 | End: 2024-05-23
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
## 2024-05-20 NOTE — DISCHARGE INSTRUCTIONS
Symptomatic care is recommended. Take all medications as prescribed and instructed. Follow up with one of the recommended orthopedics as directed or return to Emergency Department with worsening of symptoms.

## 2024-11-22 ENCOUNTER — TRANSCRIBE ORDERS (OUTPATIENT)
Dept: ADMINISTRATIVE | Facility: HOSPITAL | Age: 69
End: 2024-11-22
Payer: MEDICARE

## 2024-11-22 DIAGNOSIS — M25.512 LEFT SHOULDER PAIN, UNSPECIFIED CHRONICITY: Primary | ICD-10-CM

## 2025-04-14 PROCEDURE — 87086 URINE CULTURE/COLONY COUNT: CPT | Performed by: NURSE PRACTITIONER

## (undated) DEVICE — LP VESL MAXI 2.5X1MM BLU 2PK

## (undated) DEVICE — GLV SURG SENSICARE PI MIC PF SZ7.5 LF STRL

## (undated) DEVICE — PK CYSTO-TUR BASIC 10

## (undated) DEVICE — PK MAJ SHLDR SPLT 10

## (undated) DEVICE — INTENDED FOR TISSUE SEPARATION, AND OTHER PROCEDURES THAT REQUIRE A SHARP SURGICAL BLADE TO PUNCTURE OR CUT.: Brand: BARD-PARKER ® CARBON RIB-BACK BLADES

## (undated) DEVICE — T-MAX DISPOSABLE FACE MASK 8 PER BOX

## (undated) DEVICE — INTENT TO BE USED WITH SUTURE MATERIAL FOR TISSUE CLOSURE: Brand: RICHARD-ALLAN® NEEDLE 1/2 CIRCLE TAPER

## (undated) DEVICE — BLANKT WARM UPPR/BDY ARM/OUT 57X196CM

## (undated) DEVICE — 1010 S-DRAPE TOWEL DRAPE 10/BX: Brand: STERI-DRAPE™

## (undated) DEVICE — DRSNG SURG AQUACEL AG 9X25CM

## (undated) DEVICE — UNDERGLV SURG BIOGEL INDICAT PI SZ8 BLU

## (undated) DEVICE — INTENDED FOR TISSUE SEPARATION, AND OTHER PROCEDURES THAT REQUIRE A SHARP SURGICAL BLADE TO PUNCTURE OR CUT.: Brand: BARD-PARKER ® SAFETYLOCK CARBON RIB-BACK BLADES

## (undated) DEVICE — 450 ML BOTTLE OF 0.05% CHLORHEXIDINE GLUCONATE IN 99.95% STERILE WATER FOR IRRIGATION, USP AND APPLICATOR.: Brand: IRRISEPT ANTIMICROBIAL WOUND LAVAGE

## (undated) DEVICE — GLV SURG SENSICARE PI ORTHO SZ7.5 LF STRL

## (undated) DEVICE — 3M™ IOBAN™ 2 ANTIMICROBIAL INCISE DRAPE 6651EZ: Brand: IOBAN™ 2

## (undated) DEVICE — 2.5MM DRILL: Brand: 2.5MM DRILL

## (undated) DEVICE — SUT VIC 2/0 CT2 27IN J269H

## (undated) DEVICE — SCRUBIN SCRUB BRUSH DRY STER: Brand: MEDLINE INDUSTRIES, INC.

## (undated) DEVICE — SUT ETHLN 3/0 PC5 18IN 1893G

## (undated) DEVICE — K-WIRE, Ø1.6 X L150MM
Type: IMPLANTABLE DEVICE | Site: ARM | Status: NON-FUNCTIONAL
Brand: K-WIRE, Ø1.6MM X L150MM
Removed: 2020-12-08

## (undated) DEVICE — C-ARM: Brand: UNBRANDED

## (undated) DEVICE — PUMP PAIN AUTOFUSER AUTO SELCT NOBOLUS 1TO14ML/HR 550ML DISP

## (undated) DEVICE — BNDG ELAS CO-FLEX SLF ADHR 6IN 5YD LF STRL

## (undated) DEVICE — DRAPE,TOP,102X53,STERILE: Brand: MEDLINE

## (undated) DEVICE — SWABSTK SKINPREP PVPI PRE/SAT 8IN STRL

## (undated) DEVICE — CVR HNDL LIGHT RIGID

## (undated) DEVICE — ANTIBACTERIAL UNDYED BRAIDED (POLYGLACTIN 910), SYNTHETIC ABSORBABLE SUTURE: Brand: COATED VICRYL

## (undated) DEVICE — ISO/ALC 70PCT 16OZ

## (undated) DEVICE — GOWN,NON-REINFORCED,SIRUS,SET IN SLV,XL: Brand: MEDLINE

## (undated) DEVICE — STERILE PVP: Brand: MEDLINE INDUSTRIES, INC.

## (undated) DEVICE — 3M™ STERI-DRAPE™ U-DRAPE 1015: Brand: STERI-DRAPE™